# Patient Record
Sex: MALE | Race: WHITE | NOT HISPANIC OR LATINO | URBAN - METROPOLITAN AREA
[De-identification: names, ages, dates, MRNs, and addresses within clinical notes are randomized per-mention and may not be internally consistent; named-entity substitution may affect disease eponyms.]

---

## 2019-11-01 ENCOUNTER — OFFICE VISIT - HEALTHEAST (OUTPATIENT)
Dept: FAMILY MEDICINE | Facility: CLINIC | Age: 59
End: 2019-11-01

## 2019-11-01 DIAGNOSIS — Z12.11 SCREEN FOR COLON CANCER: ICD-10-CM

## 2019-11-01 DIAGNOSIS — G47.01 INSOMNIA DUE TO MEDICAL CONDITION: ICD-10-CM

## 2019-11-01 DIAGNOSIS — Z79.899 CONTROLLED SUBSTANCE AGREEMENT SIGNED: ICD-10-CM

## 2019-11-01 DIAGNOSIS — Z80.8 FAMILY HISTORY OF SKIN CANCER: ICD-10-CM

## 2019-11-01 DIAGNOSIS — Z13.220 SCREENING, LIPID: ICD-10-CM

## 2019-11-01 DIAGNOSIS — Z85.46 HISTORY OF PROSTATE CANCER: ICD-10-CM

## 2019-11-01 DIAGNOSIS — Z00.00 ROUTINE GENERAL MEDICAL EXAMINATION AT A HEALTH CARE FACILITY: ICD-10-CM

## 2019-11-01 LAB
CANNABINOIDS UR QL SCN: NORMAL
CHOLEST SERPL-MCNC: 208 MG/DL
COCAINE UR QL: NORMAL
CREAT UR-MCNC: 25.6 MG/DL
FASTING STATUS PATIENT QL REPORTED: ABNORMAL
HDLC SERPL-MCNC: 48 MG/DL
LDLC SERPL CALC-MCNC: 132 MG/DL
PSA SERPL-MCNC: 0.4 NG/ML (ref 0–4.5)
TRIGL SERPL-MCNC: 140 MG/DL

## 2019-11-01 ASSESSMENT — MIFFLIN-ST. JEOR: SCORE: 1491.28

## 2020-01-10 ENCOUNTER — RECORDS - HEALTHEAST (OUTPATIENT)
Dept: ADMINISTRATIVE | Facility: OTHER | Age: 60
End: 2020-01-10

## 2020-01-29 ENCOUNTER — RECORDS - HEALTHEAST (OUTPATIENT)
Dept: ADMINISTRATIVE | Facility: OTHER | Age: 60
End: 2020-01-29

## 2020-01-30 ENCOUNTER — RECORDS - HEALTHEAST (OUTPATIENT)
Dept: ADMINISTRATIVE | Facility: OTHER | Age: 60
End: 2020-01-30

## 2020-03-25 ENCOUNTER — NURSE TRIAGE (OUTPATIENT)
Dept: NURSING | Facility: CLINIC | Age: 60
End: 2020-03-25

## 2020-03-25 ENCOUNTER — OFFICE VISIT - HEALTHEAST (OUTPATIENT)
Dept: FAMILY MEDICINE | Facility: CLINIC | Age: 60
End: 2020-03-25

## 2020-03-25 DIAGNOSIS — H10.32 ACUTE CONJUNCTIVITIS OF LEFT EYE, UNSPECIFIED ACUTE CONJUNCTIVITIS TYPE: ICD-10-CM

## 2020-03-25 RX ORDER — KETOCONAZOLE 20 MG/ML
1 SHAMPOO TOPICAL DAILY
Status: SHIPPED | COMMUNITY
Start: 2020-01-16 | End: 2022-03-22

## 2020-03-25 NOTE — TELEPHONE ENCOUNTER
"Tate Christenson called with concerns re: possible pink eye.  Reports 2 days of reddened sclera left eye with itchiness, pain and some loss of visual acuity.    Information given for his primary care clinic ([facility]).   Maribeth Myers, RN, BSN  [facility] Nurse Triage      Additional Information    Negative: Chemical got in the eye    Negative: Piece of something got in the eye    Negative: Followed an eye injury    Negative: Yellow or green pus in the eyes    Negative: Severe eye pain    Negative: Patient sounds very sick or weak to the triager    Negative: Blurred vision    Negative: Cloudy spot or sore seen on the cornea (clear part of the eye)    Negative: Eyelids are very swollen (shut or almost)    Negative: Eyelid (outer) is very red    Negative: Vomiting    Negative: Foreign body sensation ('feels like something is in there')    Negative: Recent eye surgery and has increasing eye pain    Negative: Eye pain/discomfort that is more than mild    Only 1 eye is red, and persists > 48 hours    Answer Assessment - Initial Assessment Questions  1. LOCATION: Location: \"What's red, the eyeball or the outer eyelids?\" (Note: when callers say the eye is red, they usually mean the sclera is red)        sclera  2. REDNESS OF SCLERA: \"Is the redness in one or both eyes?\" \"When did the redness start?\"       Left eye  3. ONSET: \"When did the eye become red?\" (e.g., hours, days)       2 days  4. EYELIDS: \"Are the eyelids red or swollen?\" If so, ask: \"How much?\"       No/no  5. VISION: \"Is there any difficulty seeing clearly?\"       Blurring noted with distance  6. ITCHING: \"Does it feel itchy?\" If so ask: \"How bad is it\" (e.g., Scale 1-10; or mild, moderate, severe)      Itchy; 4-5/10  7. PAIN: \"Is there any pain? If so, ask: \"How bad is it?\" (e.g., Scale 1-10; or mild, moderate, severe)      Pain; 4-5/10  8. CONTACT LENS: \"Do you wear contacts?\"      No   9. CAUSE: \"What do you think is causing " "the redness?\"      Unknown   10. OTHER SYMPTOMS: \"Do you have any other symptoms?\" (e.g., fever, runny nose, cough, vomiting)        none    Protocols used: EYE - RED WITHOUT PUS-A-OH      "

## 2020-03-26 ENCOUNTER — COMMUNICATION - HEALTHEAST (OUTPATIENT)
Dept: SCHEDULING | Facility: CLINIC | Age: 60
End: 2020-03-26

## 2020-04-18 ENCOUNTER — COMMUNICATION - HEALTHEAST (OUTPATIENT)
Dept: SCHEDULING | Facility: CLINIC | Age: 60
End: 2020-04-18

## 2020-04-20 ENCOUNTER — OFFICE VISIT - HEALTHEAST (OUTPATIENT)
Dept: FAMILY MEDICINE | Facility: CLINIC | Age: 60
End: 2020-04-20

## 2020-04-20 DIAGNOSIS — H10.33 ACUTE CONJUNCTIVITIS OF BOTH EYES, UNSPECIFIED ACUTE CONJUNCTIVITIS TYPE: ICD-10-CM

## 2020-06-14 ENCOUNTER — COMMUNICATION - HEALTHEAST (OUTPATIENT)
Dept: FAMILY MEDICINE | Facility: CLINIC | Age: 60
End: 2020-06-14

## 2020-06-14 DIAGNOSIS — G47.01 INSOMNIA DUE TO MEDICAL CONDITION: ICD-10-CM

## 2020-06-16 ENCOUNTER — COMMUNICATION - HEALTHEAST (OUTPATIENT)
Dept: FAMILY MEDICINE | Facility: CLINIC | Age: 60
End: 2020-06-16

## 2020-07-21 ENCOUNTER — COMMUNICATION - HEALTHEAST (OUTPATIENT)
Dept: FAMILY MEDICINE | Facility: CLINIC | Age: 60
End: 2020-07-21

## 2020-07-21 DIAGNOSIS — G47.01 INSOMNIA DUE TO MEDICAL CONDITION: ICD-10-CM

## 2020-08-12 ENCOUNTER — COMMUNICATION - HEALTHEAST (OUTPATIENT)
Dept: FAMILY MEDICINE | Facility: CLINIC | Age: 60
End: 2020-08-12

## 2020-08-12 DIAGNOSIS — G47.01 INSOMNIA DUE TO MEDICAL CONDITION: ICD-10-CM

## 2020-09-16 ENCOUNTER — COMMUNICATION - HEALTHEAST (OUTPATIENT)
Dept: SCHEDULING | Facility: CLINIC | Age: 60
End: 2020-09-16

## 2020-09-16 DIAGNOSIS — G47.01 INSOMNIA DUE TO MEDICAL CONDITION: ICD-10-CM

## 2020-09-18 ENCOUNTER — COMMUNICATION - HEALTHEAST (OUTPATIENT)
Dept: FAMILY MEDICINE | Facility: CLINIC | Age: 60
End: 2020-09-18

## 2020-10-15 ENCOUNTER — COMMUNICATION - HEALTHEAST (OUTPATIENT)
Dept: ADMINISTRATIVE | Facility: CLINIC | Age: 60
End: 2020-10-15

## 2020-10-15 DIAGNOSIS — G47.01 INSOMNIA DUE TO MEDICAL CONDITION: ICD-10-CM

## 2020-12-15 ENCOUNTER — COMMUNICATION - HEALTHEAST (OUTPATIENT)
Dept: FAMILY MEDICINE | Facility: CLINIC | Age: 60
End: 2020-12-15

## 2020-12-15 DIAGNOSIS — G47.01 INSOMNIA DUE TO MEDICAL CONDITION: ICD-10-CM

## 2020-12-29 ENCOUNTER — OFFICE VISIT - HEALTHEAST (OUTPATIENT)
Dept: FAMILY MEDICINE | Facility: CLINIC | Age: 60
End: 2020-12-29

## 2020-12-29 DIAGNOSIS — Z13.220 SCREENING, LIPID: ICD-10-CM

## 2020-12-29 DIAGNOSIS — Z00.00 ROUTINE GENERAL MEDICAL EXAMINATION AT A HEALTH CARE FACILITY: ICD-10-CM

## 2020-12-29 DIAGNOSIS — Z85.46 HISTORY OF PROSTATE CANCER: ICD-10-CM

## 2020-12-29 DIAGNOSIS — Z11.59 NEED FOR HEPATITIS C SCREENING TEST: ICD-10-CM

## 2020-12-29 DIAGNOSIS — Z79.899 CONTROLLED SUBSTANCE AGREEMENT SIGNED: ICD-10-CM

## 2020-12-29 LAB — PSA SERPL-MCNC: 0.8 NG/ML (ref 0–4.5)

## 2020-12-29 RX ORDER — SODIUM PHOSPHATE,MONO-DIBASIC 19G-7G/118
1 ENEMA (ML) RECTAL 3 TIMES DAILY
Status: SHIPPED | COMMUNITY
Start: 2020-12-29

## 2020-12-29 ASSESSMENT — MIFFLIN-ST. JEOR: SCORE: 1538.63

## 2020-12-30 LAB — HCV AB SERPL QL IA: NEGATIVE

## 2021-01-01 LAB
6MAM UR QL: NOT DETECTED
7AMINOCLONAZEPAM UR QL: NOT DETECTED
A-OH ALPRAZ UR QL: PRESENT
ALPRAZ UR QL: NOT DETECTED
AMPHET UR QL SCN: NOT DETECTED
BARBITURATES UR QL: NOT DETECTED
BUPRENORPHINE UR QL: NOT DETECTED
BZE UR QL: NOT DETECTED
CARBOXYTHC UR QL: NOT DETECTED
CARISOPRODOL UR QL: NOT DETECTED
CLONAZEPAM UR QL: NOT DETECTED
CODEINE UR QL: NOT DETECTED
CREAT UR-MCNC: 85.4 MG/DL (ref 20–400)
DIAZEPAM UR QL: NOT DETECTED
ETHYL GLUCURONIDE UR QL: NOT DETECTED
FENTANYL UR QL: NOT DETECTED
HYDROCODONE UR QL: NOT DETECTED
HYDROMORPHONE UR QL: NOT DETECTED
LORAZEPAM UR QL: NOT DETECTED
MDA UR QL: NOT DETECTED
MDEA UR QL: NOT DETECTED
MDMA UR QL: NOT DETECTED
ME-PHENIDATE UR QL: NOT DETECTED
MEPERIDINE UR QL: NOT DETECTED
METHADONE UR QL: NOT DETECTED
METHAMPHET UR QL: NOT DETECTED
MIDAZOLAM UR QL SCN: NOT DETECTED
MORPHINE UR QL: NOT DETECTED
NORBUPRENORPHINE UR QL CFM: NOT DETECTED
NORDIAZEPAM UR QL: NOT DETECTED
NORFENTANYL UR QL: NOT DETECTED
NORHYDROCODONE UR QL CFM: NOT DETECTED
NOROXYCODONE UR QL CFM: NOT DETECTED
NOROXYMORPHONE UR QL SCN: NOT DETECTED
OXAZEPAM UR QL: NOT DETECTED
OXYCODONE UR QL: NOT DETECTED
OXYMORPHONE UR QL: NOT DETECTED
PATHOLOGY STUDY: NORMAL
PCP UR QL: NOT DETECTED
PHENTERMINE UR QL: NOT DETECTED
PROPOXYPH UR QL: NOT DETECTED
SERVICE CMNT-IMP: NORMAL
TAPENTADOL UR QL SCN: NOT DETECTED
TAPENTADOL UR QL SCN: NOT DETECTED
TEMAZEPAM UR QL: NOT DETECTED
TRAMADOL UR QL: NOT DETECTED
ZOLPIDEM UR QL: NOT DETECTED

## 2021-01-06 ENCOUNTER — COMMUNICATION - HEALTHEAST (OUTPATIENT)
Dept: FAMILY MEDICINE | Facility: CLINIC | Age: 61
End: 2021-01-06

## 2021-01-06 DIAGNOSIS — Z85.46 HISTORY OF PROSTATE CANCER: ICD-10-CM

## 2021-01-07 ENCOUNTER — COMMUNICATION - HEALTHEAST (OUTPATIENT)
Dept: ADMINISTRATIVE | Facility: CLINIC | Age: 61
End: 2021-01-07

## 2021-01-07 ENCOUNTER — AMBULATORY - HEALTHEAST (OUTPATIENT)
Dept: FAMILY MEDICINE | Facility: CLINIC | Age: 61
End: 2021-01-07

## 2021-01-07 DIAGNOSIS — G47.01 INSOMNIA DUE TO MEDICAL CONDITION: ICD-10-CM

## 2021-01-07 DIAGNOSIS — R97.21 INCREASING PSA LEVEL AFTER TREATMENT FOR PROSTATE CANCER: ICD-10-CM

## 2021-01-07 DIAGNOSIS — Z85.46 HISTORY OF PROSTATE CANCER: ICD-10-CM

## 2021-01-07 RX ORDER — TAMSULOSIN HYDROCHLORIDE 0.4 MG/1
CAPSULE ORAL
Qty: 90 CAPSULE | Refills: 3 | Status: SHIPPED | OUTPATIENT
Start: 2021-01-07 | End: 2021-12-30

## 2021-02-08 ENCOUNTER — COMMUNICATION - HEALTHEAST (OUTPATIENT)
Dept: FAMILY MEDICINE | Facility: CLINIC | Age: 61
End: 2021-02-08

## 2021-02-08 DIAGNOSIS — Z79.899 CONTROLLED SUBSTANCE AGREEMENT SIGNED: ICD-10-CM

## 2021-02-08 DIAGNOSIS — G47.01 INSOMNIA DUE TO MEDICAL CONDITION: ICD-10-CM

## 2021-02-08 RX ORDER — ALPRAZOLAM 0.5 MG
0.5 TABLET ORAL
Qty: 30 TABLET | Refills: 5 | Status: SHIPPED | OUTPATIENT
Start: 2021-02-08 | End: 2021-08-03

## 2021-03-11 ENCOUNTER — COMMUNICATION - HEALTHEAST (OUTPATIENT)
Dept: ADMINISTRATIVE | Facility: CLINIC | Age: 61
End: 2021-03-11

## 2021-04-02 ENCOUNTER — OFFICE VISIT - HEALTHEAST (OUTPATIENT)
Dept: FAMILY MEDICINE | Facility: CLINIC | Age: 61
End: 2021-04-02

## 2021-04-02 DIAGNOSIS — Z85.46 HISTORY OF PROSTATE CANCER: ICD-10-CM

## 2021-04-02 DIAGNOSIS — L03.031 PARONYCHIA OF TOE, RIGHT: ICD-10-CM

## 2021-04-02 DIAGNOSIS — R97.21 INCREASING PSA LEVEL AFTER TREATMENT FOR PROSTATE CANCER: ICD-10-CM

## 2021-04-02 LAB — PSA SERPL-MCNC: 0.4 NG/ML (ref 0–4.5)

## 2021-04-08 ENCOUNTER — COMMUNICATION - HEALTHEAST (OUTPATIENT)
Dept: INTERNAL MEDICINE | Facility: CLINIC | Age: 61
End: 2021-04-08

## 2021-04-15 ENCOUNTER — COMMUNICATION - HEALTHEAST (OUTPATIENT)
Dept: FAMILY MEDICINE | Facility: CLINIC | Age: 61
End: 2021-04-15

## 2021-04-15 ENCOUNTER — OFFICE VISIT - HEALTHEAST (OUTPATIENT)
Dept: FAMILY MEDICINE | Facility: CLINIC | Age: 61
End: 2021-04-15

## 2021-04-15 DIAGNOSIS — L60.0 INGROWN TOENAIL: ICD-10-CM

## 2021-04-19 ENCOUNTER — AMBULATORY - HEALTHEAST (OUTPATIENT)
Dept: NURSING | Facility: CLINIC | Age: 61
End: 2021-04-19

## 2021-05-10 ENCOUNTER — AMBULATORY - HEALTHEAST (OUTPATIENT)
Dept: NURSING | Facility: CLINIC | Age: 61
End: 2021-05-10

## 2021-06-02 NOTE — PROGRESS NOTES
MALE PREVENTATIVE EXAM    Assessment and Plan:       1. Routine general medical examination at a health care facility    2. Insomnia due to medical condition  Well controlled on:   - ALPRAZolam (XANAX) 0.5 MG tablet; Take 1 tablet (0.5 mg total) by mouth at bedtime as needed for sleep.  Dispense: 30 tablet; Refill: 5    3. Controlled substance agreement signed  For above medication  - Drug Abuse 3, Urine    4. Family history of skin cancer  - Ambulatory referral to Dermatology    5. Screen for colon cancer  - Ambulatory referral for Colonoscopy    6. History of prostate cancer  - tamsulosin (FLOMAX) 0.4 mg cap; Take 1 capsule (0.4 mg total) by mouth daily after supper.  Dispense: 90 capsule; Refill: 3  - PSA, Annual Screen (Prostatic-Specific Antigen)    7. Screening, lipid  - Lipid Cascade FASTING        Next follow up:  No follow-ups on file.    Immunization Review  Adult Imm Review: No immunizations due today    I discussed the following with the patient:        Patient Instructions   Call to schedule  [facility]  PHONE: [phone]    Send a copy of your living will by [employer]    Check for coverage of shingles vaccine      I have had an Advance Directives discussion with the patient.    Subjective:   Chief Complaint: Tate Brothers is an 59 y.o. male here for a preventative health visit.     HPI:  Tate is new to [facility] and [facility].  He and his wife moved here from [place], [place] and have been rehabbing a house here.  They are both retired elementary school teachers    Ongoing issues    Tate - had prostate cancer 8-9 years ago - PSA levels have come down.  Had been getting yearly PSA with his urologist  until 2 years ago -at that point his urologist 'released' him but urged continued yearly checks through PCP.  Tate takes Flomax    Tinnitus: Tate has had ringing of the ears for a long time - now wears hearing aids that help him ignore the  ringing and to get to sleep. Also, he takes  lipoflavinoid supplement  for ear health/ringing and has found this helpful    Insomnia - Tate says he has been taking alprazolam 0.5 mg nightly for years and this works well    Saw a dermatologist in [place] - has seborrheic dermatitis - uses a skin cream; has had some sun damage -  He is concerned about a  SPOT on left lower leg -    Preventive   - Had a colonoscopy age 48 - found benign polyps - then got one age 53 - when he turned 58 was when he was in the process of moving so he is due now for colonoscopy   - takes  glucosamine - for joints - runs 5-6 miles daily   - he HAS a living will    Social history - he grew up on [place] farm in [place] - parents immigrants from [place].  He is doing some part-time work for a [employer] company.  He and his wife like it here - daughter went to [facility] - working in [employer]. They have two children - son Fred is looking for working and living with them. They have a cat        Healthy Habits  Are you taking a daily aspirin? No  Do you typically exercising at least 40 min, 3-4 times per week?  Yes  - running daily  Do you usually eat at least 4 servings of fruit and vegetables a day, include whole grains and fiber and avoid regularly eating high fat foods? NO - maybe 2-3  Have you had an eye exam in the past two years? NO  Do you see a dentist twice per year? Yes  Do you have any concerns regarding sleep? No    Safety Screen  If you own firearms, are they secured in a locked gun cabinet or with trigger locks? The patient does not own any firearms  Do you feel you are safe where you are living?: Yes (11/1/2019 11:10 AM)  Do you feel you are safe in your relationship(s)?: Yes (11/1/2019 11:10 AM)      Review of Systems:    Constitutional: negative for recent illness or change in weight  Eyes: negative for irritation and vision change  Ears, nose, mouth, throat, and face: negative for nasal congestion and sore throat  Respiratory: negative for  "cough and dyspnea on exertion  Cardiovascular: negative for chest pain and palpitations  Gastrointestinal: negative for abdominal pain and change in bowel habits  Genitourinary:negative for dysuria, frequency and hesitancy  Integument/breast: negative for rash  Hematologic/lymphatic: negative for bleeding and easy bruising  Musculoskeletal:negative for arthralgias and myalgias  Neurological: negative for dizziness, headaches and paresthesia      Cancer Screening       Status Date      COLONOSCOPY Overdue 4/7/2008             History     Reviewed By Date/Time Sections Reviewed    Sonali Zacarias MD 11/1/2019 12:10 PM Tobacco, Alcohol, Drug Use, Sexual Activity    Sonali Zacarias MD 11/1/2019 12:09 PM Family    Sonali Zacarias MD 11/1/2019 11:55 AM Geno Braun CMA 11/1/2019 11:14 AM Tobacco, Alcohol, Drug Use, Sexual Activity            Objective:   Vital Signs:   Visit Vitals  /78 (Patient Site: Right Arm, Patient Position: Sitting, Cuff Size: Adult Regular)   Pulse 60   Ht 5' 9.75\" (1.772 m)   Wt 153 lb (69.4 kg)   BMI 22.11 kg/m           PHYSICAL EXAM  General appearance - alert, well appearing, and in no distress  Mental status - normal mood, behavior, speech, dress, motor activity, and thought processes  Eyes - pupils equal and reactive, extraocular eye movements intact, funduscopic exam normal, discs flat and sharp  Ears - bilateral TM's and external ear canals normal  Mouth - mucous membranes moist, pharynx normal without lesions  Neck - supple, no significant adenopathy, carotids upstroke normal bilaterally, no bruits, thyroid exam: thyroid is normal in size without nodules or tenderness  Chest - clear to auscultation, no wheezes, rales or rhonchi, symmetric air entry  Heart - normal rate, regular rhythm, normal S1, S2, no murmurs, rubs, clicks or gallops  Abdomen - soft, nontender, nondistended, no masses or organomegaly  Neurological - alert, oriented, normal speech, no focal findings or " movement disorder noted, DTR's normal and symmetric  Extremities - peripheral pulses normal, no pedal edema, no clubbing or cyanosis  Skin - no rashes or worrisome lesions; fair skin, some non-descript sun damage on face, seborrheic dermatitis around eyebrows, scattered seborrheic keratoses, including a dry patch on left leg (the spot he was concerned about)

## 2021-06-02 NOTE — PATIENT INSTRUCTIONS - HE
Call to schedule  [facility]  PHONE: [phone]    Send a copy of your living will by [employer]    Check for coverage of shingles vaccine

## 2021-06-03 VITALS
BODY MASS INDEX: 21.9 KG/M2 | WEIGHT: 153 LBS | HEART RATE: 60 BPM | SYSTOLIC BLOOD PRESSURE: 120 MMHG | DIASTOLIC BLOOD PRESSURE: 78 MMHG | HEIGHT: 70 IN

## 2021-06-05 VITALS
HEART RATE: 56 BPM | OXYGEN SATURATION: 98 % | BODY MASS INDEX: 23.27 KG/M2 | SYSTOLIC BLOOD PRESSURE: 110 MMHG | WEIGHT: 162.56 LBS | DIASTOLIC BLOOD PRESSURE: 80 MMHG | HEIGHT: 70 IN

## 2021-06-05 VITALS
OXYGEN SATURATION: 99 % | SYSTOLIC BLOOD PRESSURE: 114 MMHG | TEMPERATURE: 97.9 F | DIASTOLIC BLOOD PRESSURE: 70 MMHG | WEIGHT: 161 LBS | HEART RATE: 51 BPM | BODY MASS INDEX: 23.1 KG/M2

## 2021-06-07 NOTE — TELEPHONE ENCOUNTER
Diagnosed with pink eye at end of March. Started on polymyxin B sulfate trimethoprim ophthalmic solutionprescription on March 25th. Started with his left eye and then started having R eye symptoms so began using also for right eye on 27th of March. Pt states he used this 3x/day for 7 days. Stopped using on right eye 4/2. Symptoms subsided for the most part in the week after that. Tried OTC eyedrops afterwards from [email or web address] dextram 70% polyethylene glycol for dryness.    Last night patient noticed L eye is red and having small amount of clear discharge with yellow crusts in the corner of eyes. Eyelids intermittently sticking together. Denies changes in vision.Denies swelling. No pain. Denies fever. Pt has slight runny nose. Denies any other abnormalities.      Pt does not want to do [facility] visit at this time. Will manage symptoms at home for now with cleansing. Set up with central scheduling to schedule a telephone visit with provider.     Reason for Disposition    [1] Eye with yellow/green discharge or eyelashes stick together AND [2] NO PCP standing order to call in antibiotic eye drops    Protocols used: EYE - PUS OR [id number] Lane RN

## 2021-06-07 NOTE — TELEPHONE ENCOUNTER
RN triage   Call from pt   Pt states he had telephone visit yesterday for pink eye  Pt states he forgot to ask provider when he can go back to work -   Discussed current state order to stay home  reviewed home care advice - and contagiousness - and when to call back  Rivka Alonso RN [facility] RN triage

## 2021-06-07 NOTE — PROGRESS NOTES
"Tate Brothers is a 59 y.o. male who is being evaluated via a billable telephone visit.      CALL # [phone]    The patient has been notified of following:     \"This telephone visit will be conducted via a call between you and your physician/provider. We have found that certain health care needs can be provided without the need for a physical exam.  This service lets us provide the care you need with a short phone conversation.  If a prescription is necessary we can send it directly to your pharmacy.  If lab work is needed we can place an order for that and you can then stop by our lab to have the test done at a later time.    If during the course of the call the physician/provider feels a telephone visit is not appropriate, you will not be charged for this service.\"         Tate Brothers complains of    Chief Complaint   Patient presents with     Conjunctivitis     3 days very itchy       I have reviewed and updated the patient's Past Medical History, Social History, Family History and Medication List.    ALLERGIES  Patient has no known allergies.      Vega Tapia Jr., [facility]      Assessment/Plan:     Problem List Items Addressed This Visit     None        No follow-ups on file.    Subjective:   59 y.o. male presents for 3 days of eye itching and pinkness.  Did try refresh drops without help.  No changes in vision.  Slight discharge in the corners of the eyes and across the eyelids in the morning.  Only left eye is affected.  No pain in the temporal area or recent illness, fevers or chills.      Conjunctivitis    Episode onset: 3 days ago. The onset was gradual. The problem has been gradually worsening. Nothing relieves the symptoms. Nothing aggravates the symptoms. Associated symptoms include eye itching and eye discharge. Pertinent negatives include no fever, no decreased vision, no double vision, no photophobia, no abdominal pain, no nausea, no congestion, no ear discharge, no ear pain, no " headaches, no hearing loss, no mouth sores, no rhinorrhea, no sore throat, no swollen glands, no muscle aches, no cough and no eye pain. The left eye is affected.       Review of Systems   Constitutional: Negative for fever.   HENT: Negative for congestion, ear discharge, ear pain, hearing loss, mouth sores, rhinorrhea and sore throat.    Eyes: Positive for discharge and itching. Negative for double vision, photophobia and pain.   Respiratory: Negative for cough.    Gastrointestinal: Negative for abdominal pain and nausea.   Neurological: Negative for headaches.        History     Reviewed By Date/Time Sections Reviewed    Mike Casanova,  3/25/2020  4:02 PM Medical, Surgical, Tobacco, Family, Socioeconomic    Regina, Vega DUBOIS Jr., [facility] 3/25/2020  3:38 PM Tobacco           Objective:   There were no vitals filed for this visit.  Physical Exam  No physical exam: Due to current viral pandemic patient was evaluated by phone.    Total time: 8 minutes 59 seconds  This note has been dictated using voice recognition software. Any grammatical or context distortions are unintentional and inherent to the software

## 2021-06-07 NOTE — PROGRESS NOTES
"Tate Brothers is a 60 y.o. male who is being evaluated via a billable video visit.      The patient has been notified of following:     \"This video visit will be conducted via a call between you and your physician/provider. We have found that certain health care needs can be provided without the need for an in-person physical exam.  This service lets us provide the care you need with a video conversation.  If a prescription is necessary we can send it directly to your pharmacy.  If lab work is needed we can place an order for that and you can then stop by our lab to have the test done at a later time.    Video visits are billed at different rates depending on your insurance coverage. Please reach out to your insurance provider with any questions.    If during the course of the call the physician/provider feels a video visit is not appropriate, you will not be charged for this service.\"    Patient has given verbal consent to a Video visit? Yes    Patient would like to receive their AVS by AVS Preference: Karly.    Patient would like the video invitation sent by: Text to cell phone: [phone]      Phone Start Time: .4:05  Additional provider notes:     Assessment/Plan:     Problem List Items Addressed This Visit     None      Visit Diagnoses     Acute conjunctivitis of both eyes, unspecified acute conjunctivitis type    -  Primary    Most likely allergy. discussed OTC symptomatic treatment as needed.        Return in about 6 months (around 11/1/2020) for Annual physical.    Subjective:   60 y.o. male presents for follow-up of recent conjunctivitis.  Actually gotten better on the eyedrops.  Then at the end of the treatment he had 2 days in which his eye was itchy and he had some yellow matting in his eye in the more across his eyelids.  Just in the left eye with some mild irritation around the eyelids.  He did not restart the antibiotic drops but he did use a rewetting drop.  Now today he woke up in the pink " in his eye as well as the redness around his eyelid and the itching are all gone.  He has also noticed a little bit more of a runny nose.  Not full Frane congestion.  Denies any fevers or chills.  He was outside a bit more this weekend.  He was not working on anything which may have flown or gotten into his eye.  No changes in vision.  No fevers or chills.    Reviewed pollen counts over the weekend and end of this week.  Symptoms came up at a time when tree pollens were at their height.  Symptoms seem to resolve as tree pollens decreased.        Review of Systems   Constitutional: Negative for chills, fatigue and fever.   HENT: Positive for congestion, postnasal drip and sneezing. Negative for ear discharge, ear pain, facial swelling, hearing loss, sinus pressure, sinus pain, tinnitus, trouble swallowing and voice change.    Eyes: Positive for discharge, redness and itching. Negative for photophobia, pain and visual disturbance.   Respiratory: Negative for cough, choking, shortness of breath and wheezing.    Cardiovascular: Negative for chest pain, palpitations and leg swelling.   Gastrointestinal: Negative for abdominal pain.   Genitourinary: Negative for dysuria.   Musculoskeletal: Negative for neck pain.        History     Reviewed By Date/Time Sections Reviewed    Mike Casanova,  4/20/2020  3:43 PM Medical, Surgical, Tobacco, Family, Socioeconomic    ReginaVega gaston Jr., [facility] 4/20/2020  3:20 PM Tobacco           Objective:   There were no vitals filed for this visit.  Physical Exam  No physical exam.  Was planned to be a video conference however her technology failure on patient sent and thus moved over to telephone this note has been dictated using voice recognition software. Any grammatical or context distortions are unintentional and inherent to the software    Video-Visit Details    Type of service:  Video Visit converted the phone due to technology failure  Phone end Time: 4:13 PM      Originating Location (pt. Location): Home    Distant Location (provider location):  [facility]/OB     Mode of Communication:  Video Conference via [employer]: Technology failure and had to change over to a telephone call      Mike Brady Restaleonard, DO

## 2021-06-08 NOTE — TELEPHONE ENCOUNTER
He had a full physical with Dr. Zacarias in November and his address is over near the [facility]. I have only seen patient for acute problems by video and never addressed the insomnia issue. He has a controlled substance agreement with Dr. Zacarias and as such will need to forward to her for consideration of refill.     Respectfully,  Carlos Casanova DO

## 2021-06-08 NOTE — TELEPHONE ENCOUNTER
CSA with me 11/1/19 (also had drug screen on the day)    Diagnosis: insomnia  Medication : Alprazolam 0.5 mg nightly   Quantity per month: insomnia  Number of refills before follow up visit: 12    HOWEVER [place] Prescription Monitoring Program show refill from a provider in [facility] - he has not picked up extra refills, but nonetheless sought from a different provider - which is a violation of CSA - I will send him a [employer] message - will not refill this without more information

## 2021-06-08 NOTE — TELEPHONE ENCOUNTER
Controlled Substance Refill Request  Medication Name:   Requested Prescriptions     Pending Prescriptions Disp Refills     ALPRAZolam (XANAX) 0.5 MG tablet [Pharmacy Med Name: ALPRAZOLAM 0.5MG TABLETS] 30 tablet 0     Sig: TAKE 1 TABLET BY MOUTH AT BEDTIME AS NEEDED FOR SLEEP     Date Last Fill: 11/1/19  [facility]ed [facility]  Submit electronically to pharmacy  Controlled Substance Agreement on file:   Encounter-Level CSA Scan Date:    There are no encounter-level csa scan date.        Last office visit:  4/20/20

## 2021-06-09 NOTE — TELEPHONE ENCOUNTER
Who is calling:  patient  Reason for Call:  Calling to check on below request. Reports he has one day left of his medication. Please advise asap.  Date of last appointment with primary care: 4/20/20, virtual visit  Okay to leave a detailed message: Yes

## 2021-06-09 NOTE — TELEPHONE ENCOUNTER
Reviewed Karly note from patient and talked to RN at [facility] - Tate used to be seen there, hadn't been seen since last year and PCP there retired - requested refill when he was in town but  not to establish care. At this point it seems like a misunderstanding on his part.  He did not  ore medication that I had originally prescribed for him, so will let this go this time

## 2021-06-09 NOTE — TELEPHONE ENCOUNTER
[place] Prescription Monitoring Program reviewed - already discussed rx from other provider previously.  CSA up to date - will refill    Please let him know we just received this request today (7/22) - we are given 3 days to respond, and I was not in the office today

## 2021-06-09 NOTE — TELEPHONE ENCOUNTER
Controlled Substance Refill Request  Medication Name:   Requested Prescriptions     Pending Prescriptions Disp Refills     ALPRAZolam (XANAX) 0.5 MG tablet 30 tablet 0     Sig: Take 1 tablet (0.5 mg total) by mouth at bedtime as needed for sleep.     Date Last Fill: 6/18/20  [facility]ed [facility]  Submit electronically to pharmacy  Controlled Substance Agreement on file:   Encounter-Level CSA Scan Date:    There are no encounter-level csa scan date.        Last office visit:  4/20/20

## 2021-06-10 NOTE — TELEPHONE ENCOUNTER
Controlled Substance Refill Request  Medication Name:   Requested Prescriptions     Pending Prescriptions Disp Refills     ALPRAZolam (XANAX) 0.5 MG tablet 30 tablet 5     Sig: Take 1 tablet (0.5 mg total) by mouth at bedtime as needed for sleep.     Date Last Fill: 7/22/2020  [facility]ed [facility]  Submit electronically to pharmacy  Controlled Substance Agreement on file:   Encounter-Level CSA Scan Date:    There are no encounter-level csa scan date.        Last office visit:  4/20/2020

## 2021-06-10 NOTE — TELEPHONE ENCOUNTER
Contacted pt, they advised that they had a really hard time getting prescription filled with [facility] and may move to [facility] for future medication needs. They also advised they figured out Mychart problem as well, and are now using the [employer] version and will use this method or call straight to clinic for future refills.    YOKASTA WARE

## 2021-06-11 NOTE — TELEPHONE ENCOUNTER
Controlled Substance Refill Request  Medication Name:   Requested Prescriptions     Pending Prescriptions Disp Refills     ALPRAZolam (XANAX) 0.5 MG tablet 30 tablet 5     Sig: Take 1 tablet (0.5 mg total) by mouth at bedtime as needed for sleep.     Date Last Fill: 8/18/20  [facility]ed [facility]: [facility]  # [id number]  Submit electronically to pharmacy  Controlled Substance Agreement on file:   Encounter-Level CSA Scan Date:    There are no encounter-level csa scan date.        Last office visit:  9/20/20

## 2021-06-12 NOTE — TELEPHONE ENCOUNTER
He HAS refills and should not need another one now    [place] Prescription Monitoring Program also shows that he has refills left    WHO requested this refill? Not clear  Please call patient to remind him he has refills, and is due for an annual exam in November

## 2021-06-12 NOTE — TELEPHONE ENCOUNTER
Controlled Substance Refill Request  Medication Name:   Requested Prescriptions     Pending Prescriptions Disp Refills     ALPRAZolam (XANAX) 0.5 MG tablet 30 tablet 2     Sig: Take 1 tablet (0.5 mg total) by mouth at bedtime as needed for sleep.     Date Last Fill: 9/18/2020  [facility]ed [id number]  Submit electronically to pharmacy  Controlled Substance Agreement on file:   Encounter-Level CSA Scan Date:    There are no encounter-level csa scan date.        Last office visit:  4/20/2020

## 2021-06-13 NOTE — TELEPHONE ENCOUNTER
Controlled Substance Refill Request  Medication Name:   Requested Prescriptions     Pending Prescriptions Disp Refills     ALPRAZolam (XANAX) 0.5 MG tablet 30 tablet 2     Sig: Take 1 tablet (0.5 mg total) by mouth at bedtime as needed for sleep.     Date Last Fill: 9/18/2020  [facility]ed [facility]  Submit electronically to pharmacy  Controlled Substance Agreement on file:   Encounter-Level CSA Scan Date:    There are no encounter-level csa scan date.        Last office visit:  4/20/2020

## 2021-06-13 NOTE — TELEPHONE ENCOUNTER
"One month given - please schedule him for annual exam    My note for him with last refill 9/18/20:    Chandler Ybarra,     I gave you three month's refill on the medication you requested - You'll be due for an preventive exam in November.     The [facility] is still \"hibernated\" and we are seeing patients at [facility]  for the moment, but do not know what the situation will be in November.     Sonali Zacarias MD    "

## 2021-06-14 NOTE — TELEPHONE ENCOUNTER
Reason for Call:  Medication or medication refill:    Do you use a [facility]?  Name of the pharmacy and phone number for the current request: [facility] on [address]    Name of the medication requested:   ALPRAZolam (XANAX) 0.5 MG tablet 30 tablet 0 12/15/2020  No   Sig - Route: Take 1 tablet (0.5 mg total) by mouth at bedtime as needed for sleep. - Oral         Other request: na    Can we leave a detailed message on this number? Yes    Phone number patient can be reached at: Home number on file [phone] (home)    Best Time: anyphone call    Call taken on 1/7/2021 at 2:04 PM by Pamela Behr

## 2021-06-14 NOTE — PROGRESS NOTES
From last physical     Tate - had prostate cancer 8-9 years ago - PSA levels have come down.  Had been getting yearly PSA with his urologist  until 2 years ago -at that point his urologist 'released' him but urged continued yearly checks through PCP.  Tate takes Flomax     Tinnitus: Tate has had ringing of the ears for a long time - now wears hearing aids that help him ignore the ringing and to get to sleep. Also, he takes  lipoflavinoid supplement  for ear health/ringing and has found this helpful     Insomnia - Tate says he has been taking alprazolam 0.5 mg nightly for years and this works well     Saw a dermatologist in [place] - has seborrheic dermatitis - uses a skin cream; has had some sun damage -  He is concerned about a  SPOT on left lower leg -     Preventive   - Had a colonoscopy age 48 - found benign polyps - then got one age 53 - when he turned 58 was when he was in the process of moving so he is due now for colonoscopy   - takes  glucosamine - for joints - runs 5-6 miles daily   - he HAS a living will     Social history - he grew up [place] farm in [place] - parents immigrants from [place].  He is doing some part-time work for a [employer] company.  He and his wife like it here - daughter went to [employer] - working in [employer]. They have two children - son Fred is looking for working and living with them. They have a cat

## 2021-06-14 NOTE — TELEPHONE ENCOUNTER
Refill Approved    Rx renewed per Medication Renewal Policy. Medication was last renewed on 11/1/19.    Irma Almaguer, [facility] Triage/Med Refill 1/7/2021     Requested Prescriptions   Pending Prescriptions Disp Refills     tamsulosin (FLOMAX) 0.4 mg cap [Pharmacy Med Name: TAMSULOSIN 0.4MG CAPSULES] 90 capsule 3     Sig: TAKE 1 CAPSULE BY MOUTH DAILY AFTER SUPPER       Alfuzosin/Tamsulosin/Silodosin Refill Protocol  Passed - 1/6/2021  3:17 AM        Passed - PCP or prescribing provider visit in past 12 months       Last office visit with prescriber/PCP: Visit date not found OR same dept: Visit date not found OR same specialty: Visit date not found  Last physical: 12/29/2020 Last MTM visit: Visit date not found   Next visit within 3 mo: Visit date not found  Next physical within 3 mo: Visit date not found  Prescriber OR PCP: Sonali Zacarias MD  Last diagnosis associated with med order: 1. History of prostate cancer  - tamsulosin (FLOMAX) 0.4 mg cap [Pharmacy Med Name: TAMSULOSIN 0.4MG CAPSULES]; TAKE 1 CAPSULE BY MOUTH DAILY AFTER SUPPER  Dispense: 90 capsule; Refill: 3    If protocol passes may refill for 12 months if within 3 months of last provider visit (or a total of 15 months).

## 2021-06-14 NOTE — PATIENT INSTRUCTIONS - HE
"Check coverage of  1) shingles vaccine  2) \"lipofit by NMR\" - let me know if this is covered and I will order this instead of the regular panel  "

## 2021-06-14 NOTE — PROGRESS NOTES
"MALE PREVENTATIVE EXAM    Assessment and Plan:     Patient has been advised of split billing requirements and indicates understanding: Yes    1. Routine general medical examination at a health care facility  Well man    2. Screening, lipid  Not fasting today  - Lipid Towns FASTING; Future    3. Need for hepatitis C screening test  Per CDC recommendation for all  - Hepatitis C Antibody (Anti-HCV)    4. History of prostate cancer  - PSA, Annual Screen (Prostatic-Specific Antigen)   - if elevated would refer to urology    5. Controlled substance agreement signed  For alprazolam for insomnia  - Pain Management Drug Panel, Urine   -  controlled substance agreement done    Next follow up:  Return in about 1 year (around 2021) for or earlier as needed.    Immunization Review  Adult Imm Review: Due today, orders placed  influenza  Patient Instructions   Check coverage of  1) shingles vaccine  2) \"lipofit by NMR\" - let me know if this is covered and I will order this instead of the regular panel      Sonali Zacarias MD      Subjective:   Chief Complaint: Tate Brothers is an 60 y.o. male here for a preventative health visit.    Patient has been advised of split billing requirements and indicates understanding: Yes  HPI:     ONGOING ISSUES    Tinnitus - wears hearing aids - has been to a hearing doctor in [place] - got his hearing checked  - last check was 4-5 years ago.  A clinic in [place] tuned them up and they seem to be working - though she thought I I would need new ones soon.    Hyperlipidemia  Lab Results   Component Value Date    LDLCALC 132 (H) 2019     Discussed with Tate last year (via [facility]) recommendation to start statin, given ASCDV risk score. I  ask about family Dad  of a heart attack at age 67 - non smoking, and that his elevated LDL is likely genetic - he eats well and exercises    The 10-year ASCVD risk score (Joseph ARLIN Jr., et al., 2013) is: 8.3%    Values used to calculate the " "score:      Age: 60 years      Sex: Male      Is Non- : No      Diabetic: No      Tobacco smoker: No      Systolic Blood Pressure: 110 mmHg      Is BP treated: No      HDL Cholesterol: 48 mg/dL      Total Cholesterol: 208 mg/dL    Insomnia - takes alprazolam 0.5 mg many but not all nights.      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  Wife also works at same company.  Their  25 year old son Fred is living with them- he has high functioning Autism Spectrum. They have gone to several organizations for support - he has a job now at the same company - working 25 hours per week.  He does not want to talk about the future.  He has a 's licence which is progress    They a half chihuahua/ half daschund \"Milo\" for a rescue - it's been good for Fred to have [place]    Healthy Habits  Are you taking a daily aspirin? No  Do you typically exercising at least 40 min, 3-4 times per week?  Yes  - runs and walks - in the summer does a lot of biking  Do you usually eat at least 4 servings of fruit and vegetables a day, include whole grains and fiber and avoid regularly eating high fat foods? Yes, doesn't eat that much fruit in a day  Have you had an eye exam in the past two years? Yes  Do you see a dentist twice per year? Yes  Do you have any concerns regarding sleep? YES; takes alprazolam before bedtime and sleeps through the night; pt states that if he doesn't take the alprazolam and tamsulosin he can't get back to sleep if he wakes up in the night    Safety Screen  If you own firearms, are they secured in a locked gun cabinet or with trigger locks? The patient does not own any firearms  Do you feel you are safe where you are living?: Yes (12/29/2020  1:32 PM)  Do you feel you are safe in your relationship(s)?: Yes (12/29/2020  1:32 PM)      Review of " "Systems:    Constitutional: negative for recent illness or change in weight  Eyes: negative for irritation and vision change  Ears, nose, mouth, throat, and face: negative for nasal congestion and sore throat  Respiratory: negative for cough and dyspnea on exertion  Cardiovascular: negative for chest pain and palpitations  Gastrointestinal: negative for abdominal pain and change in bowel habits  Genitourinary:negative for dysuria, frequency and hesitancy  Integument/breast: negative for rash  Hematologic/lymphatic: negative for bleeding and easy bruising  Musculoskeletal:negative for arthralgias and myalgias  Neurological: negative for dizziness, headaches and paresthesia      Cancer Screening     Patient has no health maintenance due at this time            History     Reviewed By Date/Time Sections Reviewed    Rosanna Caba CMA 12/29/2020  1:32 PM Tobacco            Objective:   Vital Signs:   Visit Vitals  /80 (Patient Site: Left Arm, Patient Position: Sitting, Cuff Size: Adult Regular)   Pulse (!) 56   Ht 5' 10\" (1.778 m)   Wt 162 lb 9 oz (73.7 kg)   SpO2 98%   BMI 23.33 kg/m           PHYSICAL EXAM  General appearance - alert, well appearing, and in no distress  Mental status - normal mood, behavior, speech, dress, motor activity, and thought processes  Eyes - pupils equal and reactive, extraocular eye movements intact, funduscopic exam normal, discs flat and sharp  Ears - bilateral TM's and external ear canals normal  Mouth - mucous membranes moist, pharynx normal without lesions  Neck - supple, no significant adenopathy, carotids upstroke normal bilaterally, no bruits, thyroid exam: thyroid is normal in size without nodules or tenderness  Chest - clear to auscultation, no wheezes, rales or rhonchi, symmetric air entry  Heart - normal rate, regular rhythm, normal S1, S2, no murmurs, rubs, clicks or gallops  Abdomen - soft, nontender, nondistended, no masses or organomegaly  Neurological - alert, " oriented, normal speech, no focal findings or movement disorder noted, DTR's normal and symmetric  Extremities - peripheral pulses normal, no pedal edema, no clubbing or cyanosis  Skin - no rashes or worrisome lesions

## 2021-06-15 NOTE — TELEPHONE ENCOUNTER
Here is the last time this was filled:     Disp Refills Start End LOUISE   ALPRAZolam (XANAX) 0.5 MG tablet 30 tablet 0 1/8/2021  No   Sig - Route: Take 1 tablet (0.5 mg total) by mouth at bedtime as needed for sleep. - Oral   Sent to pharmacy as: ALPRAZolam 0.5 mg tablet (XANAX)   E-Prescribing Status: Receipt confirmed by pharmacy (1/8/2021  1:32 PM CST)     Uto and Here is CSA done 12/29/20      Non-opioid Controlled Substance Agreement  Diagnosis: insomnia  Medication : Alprazolam 0.5 mg nightly   Quantity per month: insomnia  Number of refills before follow up visit: 12

## 2021-06-15 NOTE — TELEPHONE ENCOUNTER
Reason for Call:  Medication or medication refill:    Do you use a [facility]?  Name of the [facility] and phone [facility] for the current [facility] [address]      Name of the medication requested:   ALPRAZolam (XANAX) 0.5 MG tablet 30 tablet 5 2/8/2021  No   Sig - Route: Take 1 tablet (0.5 mg total) by mouth at bedtime as needed for sleep. - Oral         Other request: NA    Can we leave a detailed message on this number? Yes    Phone number patient can be reached at: Home number on file [phone] (home)    Best Time: ANY    Call taken on 3/11/2021 at 10:15 AM by Pamela Behr

## 2021-06-15 NOTE — TELEPHONE ENCOUNTER
Reason for Call:  Medication or medication refill:    Do you use a [facility]?  Name of the pharmacy and phone number for the current request[facility] [address]    Name of the medication requested: alprazolam .5 mg     Other request:     Can we leave a detailed message on this number? Yes    Phone number patient can be reached at: Home number on file [phone] (home)    Best Time:     Call taken on 2/8/2021 at 2:36 PM by Yudith Naranjo

## 2021-06-16 PROBLEM — Z85.46 HISTORY OF PROSTATE CANCER: Status: ACTIVE | Noted: 2019-11-01

## 2021-06-16 PROBLEM — Z79.899 CONTROLLED SUBSTANCE AGREEMENT SIGNED: Status: ACTIVE | Noted: 2019-11-01

## 2021-06-16 PROBLEM — G47.01 INSOMNIA DUE TO MEDICAL CONDITION: Status: ACTIVE | Noted: 2019-11-01

## 2021-06-16 NOTE — PATIENT INSTRUCTIONS - HE
[facility]  PHONE: [phone]  PROVIDERS: Shin Tello DPM; Jon Francis DPM

## 2021-06-16 NOTE — PROGRESS NOTES
Tate Brothers is a 61 y.o. male who is being evaluated via a billable telephone visit.      What phone number would you like to be contacted at? [phone]  How would you like to obtain your AVS? AVS Preference: Karly.    Assessment & Plan     Ingrown toenail  Possibly - he will send a picture.  He is doing all the right things, cutting toenail straight across.  Discussed try a little antibiotic ointment where he is getting inflammation.  In case he needs it, I am providing a   - Ambulatory referral to Podiatry    Return if symptoms worsen or fail to improve.    Sonali Zacarias MD  [facility]    Subjective   Tate Brothers is 61 y.o. and presents today for the following health issues   HPI     Tate follows up from our last visit of 4/2/21 where we discussed management of paronychia.  He says of the same toe: it seemed like it was getting better  Then two days later it got red, inflamed and swollen and had some discharge - now a little better again. He has no pain, running has not affected it.  He tried using NewSkin on it and has been continuing to do the soaks we discussed  Pus hasn't been visible on the side of his toenail for a couple of days.  He is thinking he has an ingrown toenail, though he is careful to cut his toenail straight across    Has been wearing steel toe boots at work      Has first Covid19 Monday the 19th [facility]        Review of Systems  He is feeling otherwise well      Objective       Vitals:  No vitals were obtained today due to virtual visit.    Physical Exam  He sounds well and in good spirits          Phone call duration: 10 minutes  5:30 -  - 5:40

## 2021-06-16 NOTE — PROGRESS NOTES
Assessment and Plan    1. Paronychia of toe, right  Very mild, recommended two times a day soak  Re flags would be spreading redness - discussed    2. History of prostate cancer/3. Increasing PSA level after treatment for prostate cancer  recheck  - PSA, Annual Screen (Prostatic-Specific Antigen)    Return in about 9 months (around 1/2/2022) for Annual physical.    Sonali Zacarias MD     -------------------------------------------    Chief Complaint   Patient presents with     Nail Problem     left big toe is red and has a sore      Tate has had some sharp pain in his right great toe starting about 2 days ago, which has now lessened.  However he still has mild pain and has noticed a little redness around the toenail - wonders if it is infected.    He does not recall any injury, though it admits that if he had stubbed his toes during and activity, he might briefly notice it and then move on    Also, I had asked Tate to return for a repeat PSA check - his last PSA was 0.8 - normal, but double the previous check.  He has a history of prostate cancer and had radiation therapy    Current Outpatient Medications on File Prior to Visit   Medication Sig Dispense Refill     ALPRAZolam (XANAX) 0.5 MG tablet Take 1 tablet (0.5 mg total) by mouth at bedtime as needed for sleep. 30 tablet 5     bioflav,lemon/vit Bcomp,C (LIPOFLAVOVIT ORAL) Take by mouth.       glucosamine-chondroitin 500-400 mg cap Take 1 capsule by mouth 3 (three) times a day.       ketoconazole (NIZORAL) 2 % shampoo Apply 1 application topically daily.       tamsulosin (FLOMAX) 0.4 mg cap TAKE 1 CAPSULE BY MOUTH DAILY AFTER SUPPER 90 capsule 3     No current facility-administered medications on file prior to visit.            The history section was last reviewed by Alejandra Fink CMA on Apr 2, 2021.    Social History     Tobacco Use   Smoking Status Never Smoker   Smokeless Tobacco Never Used       Social History     Substance and Sexual Activity   Alcohol  Use Yes     Alcohol/week: 7.0 - 14.0 standard drinks     Types: 7 - 14 Cans of beer per week     Frequency: 4 or more times a week     Drinks per session: 1 or 2         Vitals:    04/02/21 1459   BP: 114/70   Pulse: (!) 51   Temp: 97.9  F (36.6  C)   SpO2: 99%     Body mass index is 23.1 kg/m .     EXAM:    General appearance - alert, well appearing, and in no distress  Mental status - normal mood, behavior, speech, dress, motor activity, and thought processes  Extremities - Faint erythema around right great toenail.  No pain with ROM pip or MTP joint.  No swelling or warmth

## 2021-06-19 NOTE — LETTER
Letter by Sonali Zacarias MD at      Author: Sonali Zacarias MD Service: -- Author Type: --    Filed:  Encounter Date: 11/1/2019 Status: Signed         [facility] FAMILY MEDICINE/OB  11/01/19    Patient: Tate Brothers  YOB: 1960  Medical Record Number: [id number]  CSN: [id number]                                                                              Non-opioid Controlled Substance Agreement  Diagnosis: insomnia  Medication : Alprazolam 0.5 mg nightly   Quantity per month: insomnia  Number of refills before follow up visit: 12    I understand that my care provider has prescribed a controlled substance to help manage my condition(s). I am taking this medicine to help me function or work. I know this is strong medicine, and that it can cause serious side effects. Controlled substances can be sedating, addicting and may cause a dependency on the drug. They can affect my ability to drive or think, and cause depression. They need to be taken exactly as prescribed. Combining controlled substances with certain medicines or chemicals (such as cocaine, sedatives and tranquilizers, sleeping pills, meth) can be dangerous or even fatal. Also, if I stop controlled substances suddenly, I may have severe withdrawal symptoms.  If not helpful, I may be asked to stop them.    The risks, benefits, and side effects of these medicine(s) were explained to me. I agree that:    1. I will take part in other treatments as advised by my care team. This may be psychiatry or counseling, physical therapy, behavioral therapy, group treatment or a referral to a pain clinic. I will reduce or stop my medicine when my care team tells me to do so.  2. I will take my medicines as prescribed. I will not change the dose or schedule unless my care team tells me to. There will be no refills if I run out early.  I may be contactedwithout warning and asked to complete a urine drug test or pill count at any time.   3. I will keep  all my appointments, and understand this is part of the monitoring of controlled substances. My care team may require an office visit for EVERY controlled substance refill. If I miss appointments or dont follow instructions, my care team may stop my medicine.  4. I will not ask other providers to prescribe controlled substances, and I will not accept controlled substances from other people. If I need another prescribed controlled substance for a new reason, I will tell my care team within 1 business day.  I will use one pharmacy to fill all of my controlled substance prescriptions, and it is up to me to make sure that I do not run out of my medicines on weekends or holidays. If my care team is willing to refill my controlled substance prescription without a visit, I must request refills only during office hours, refills may take up to 3 days to process, and it may [facility] FAMILY MEDICINE/OB  11/01/19  Patient:  Tate Brothers  YOB: 1960  Medical Record Number: [id number]  CSN: [id number]    5. take up to 5 to 7 days for my medicine to be mailed and ready at my pharmacy. Prescriptions will not be mailed anywhere except my pharmacy.    6. I am responsible for my prescriptions. If the medicine/prescription is lost or stolen, it will not be replaced. I also agree not to share controlled substance medicines with anyone.  7. I agree to not use ANY illegal or recreational drugs. This includes marijuana, cocaine, bath salts or other drugs. I agree not to use alcohol unless my care team says I may. I agree to give urine samples whenever asked. If I dont give a urine sample, the care team may stop my medicine.    8. If I enroll in the [place] Medical Marijuana program, I will tell my care team. I will also sign an agreement to share my medical records with my care team.    9. I will bring in my list of medicines (or my medicine bottles) each time I come to the clinic.   10. I will tell my care team  right away if I become pregnant or have a new medical problem treated outside of my regular clinic.  11. I understand that this medicine can affect my thinking and judgment. It may be unsafe for me to drive, use machinery and do dangerous tasks. I will not do any of these things until I know how the medicine affects me. If my dose changes, I will wait to see how it affects me. I will contact my care team if I have concerns about medicine side effects.    I understand that if I do not follow any of the conditions above, my prescriptions or treatment may be stopped.      I agree that my provider, clinic care team, and pharmacy may work with any city, state or federal law enforcement agency that investigates the misuse, sale, or other diversion of my controlled medicine. I will allow my provider to discuss my care with or share a copy of this agreement with any other treating provider, pharmacy or emergency room where I receive care. I agree to give up (waive) any right of privacy or confidentiality with respect to these consents.   I have read this agreement and have asked questions about anything I did not understand.    ___________________________________________________________________________  Patient signature - Date/Time  -Tate Brothers                                      ___________________________________________________________________________  Witness signature                                                                    ___________________________________________________________________________  Provider signature- Sonali Zacarias MD

## 2021-06-20 ENCOUNTER — HEALTH MAINTENANCE LETTER (OUTPATIENT)
Age: 61
End: 2021-06-20

## 2021-06-21 NOTE — LETTER
Letter by Sonali Zacarias MD at      Author: Sonali Zacarias MD Service: -- Author Type: --    Filed:  Encounter Date: 12/29/2020 Status: (Other)         [facility]  12/29/20    Patient: Tate Brothers  YOB: 1960  Medical Record Number: [id number]  CSN: [id number]                                                                              Non-opioid Controlled Substance Agreement  Diagnosis: insomnia  Medication : Alprazolam 0.5 mg nightly   Quantity per month: insomnia  Number of refills before follow up visit: 12    I understand that my care provider has prescribed a controlled substance to help manage my condition(s). I am taking this medicine to help me function or work. I know this is strong medicine, and that it can cause serious side effects. Controlled substances can be sedating, addicting and may cause a dependency on the drug. They can affect my ability to drive or think, and cause depression. They need to be taken exactly as prescribed. Combining controlled substances with certain medicines or chemicals (such as cocaine, sedatives and tranquilizers, sleeping pills, meth) can be dangerous or even fatal. Also, if I stop controlled substances suddenly, I may have severe withdrawal symptoms.  If not helpful, I may be asked to stop them.    The risks, benefits, and side effects of these medicine(s) were explained to me. I agree that:    1. I will take part in other treatments as advised by my care team. This may be psychiatry or counseling, physical therapy, behavioral therapy, group treatment or a referral to a pain clinic. I will reduce or stop my medicine when my care team tells me to do so.  2. I will take my medicines as prescribed. I will not change the dose or schedule unless my care team tells me to. There will be no refills if I run out early.  I may be contactedwithout warning and asked to complete a urine drug test or pill count at any time.   3. I will  keep all my appointments, and understand this is part of the monitoring of controlled substances. My care team may require an office visit for EVERY controlled substance refill. If I miss appointments or dont follow instructions, my care team may stop my medicine.  4. I will not ask other providers to prescribe controlled substances, and I will not accept controlled substances from other people. If I need another prescribed controlled substance for a new reason, I will tell my care team within 1 business day.  I will use one pharmacy to fill all of my controlled substance prescriptions, and it is up to me to make sure that I do not run out of my medicines on weekends or holidays. If my care team is willing to refill my controlled substance prescription without a visit, I must request refills only during office hours, refills may take up to 3 days to process, and it may [facility]  12/29/20  Patient:  Tate Brothers  YOB: 1960  Medical Record Number: [id number]  CSN: [id number]  5. take up to 5 to 7 days for my medicine to be mailed and ready at my pharmacy. Prescriptions will not be mailed anywhere except my pharmacy.    6. I am responsible for my prescriptions. If the medicine/prescription is lost or stolen, it will not be replaced. I also agree not to share controlled substance medicines with anyone.  7. I agree to not use ANY illegal or recreational drugs. This includes marijuana, cocaine, bath salts or other drugs. I agree not to use alcohol unless my care team says I may. I agree to give urine samples whenever asked. If I dont give a urine sample, the care team may stop my medicine.    8. If I enroll in the [place] Medical Marijuana program, I will tell my care team. I will also sign an agreement to share my medical records with my care team.    9. I will bring in my list of medicines (or my medicine bottles) each time I come to the clinic.   10. I will tell my care  team right away if I become pregnant or have a new medical problem treated outside of my regular clinic.  11. I understand that this medicine can affect my thinking and judgment. It may be unsafe for me to drive, use machinery and do dangerous tasks. I will not do any of these things until I know how the medicine affects me. If my dose changes, I will wait to see how it affects me. I will contact my care team if I have concerns about medicine side effects.    I understand that if I do not follow any of the conditions above, my prescriptions or treatment may be stopped.      I agree that my provider, clinic care team, and pharmacy may work with any city, state or federal law enforcement agency that investigates the misuse, sale, or other diversion of my controlled medicine. I will allow my provider to discuss my care with or share a copy of this agreement with any other treating provider, pharmacy or emergency room where I receive care. I agree to give up (waive) any right of privacy or confidentiality with respect to these consents.   I have read this agreement and have asked questions about anything I did not understand.    ___________________________________________________________________________  Patient signature - Date/Time  -Tate Brothers                                      ___________________________________________________________________________  Witness signature                                                                    ___________________________________________________________________________  Provider signature- Sonali Zacarias MD

## 2021-08-03 DIAGNOSIS — G47.01 INSOMNIA DUE TO MEDICAL CONDITION: ICD-10-CM

## 2021-08-03 RX ORDER — ALPRAZOLAM 0.5 MG
0.5 TABLET ORAL
Qty: 30 TABLET | Refills: 5 | Status: SHIPPED | OUTPATIENT
Start: 2021-08-03 | End: 2022-02-21

## 2021-08-03 NOTE — TELEPHONE ENCOUNTER
Reason for Call:  Medication or medication refill:    Do you use a [facility]?  Name of the pharmacy and phone number for the current request:  [facility] on stock and [email or web address]    Name of the medication requested:   ALPRAZolam (XANAX) 0.5 MG tablet 30 tablet 5 2/8/2021  No   Sig - Route: [ALPRAZOLAM (XANAX) 0.5 MG TABLET] Take 1 tablet (0.5 mg total) by mouth at bedtime as needed for sleep. - Oral         Other request: na    Can we leave a detailed message on this number? YES    Phone number patient can be reached at: Cell number on file:    Telephone Information:   Mobile [phone]       Best Time: na    Call taken on 8/3/2021 at 1:16 PM by Pam J. Behr

## 2021-08-04 NOTE — CONFIDENTIAL NOTE
CSA signed in 12/2020:    Diagnosis: insomnia  Medication : Alprazolam 0.5 mg nightly   Quantity per month: insomnia  Number of refills before follow up visit: 12    Total Private Pay: 0    Fill Date ID   Written Drug Qty Days Prescriber Rx # Pharmacy Refill   Daily Dose* Pymt Type      07/08/2021  3   02/08/2021  Alprazolam 0.5 MG Tablet  30.00  30 Ma [facility]   [id number]   [id number]   5  1.[place]   06/10/2021  1   02/08/2021  Alprazolam 0.5 MG Tablet  30.00  30 Ma Win   [id number]   [facility] ([id number]   [place]   05/11/2021  1   02/08/2021  Alprazolam 0.5 MG Tablet  30.00  30 Ma Win   [id number]   [facility] ([id number]   [place]   04/11/2021  2   02/08/2021  Alprazolam 0.5 MG Tablet  30.00  30 Ma Win   [id number]   [facility] ([id number]   [place]   03/11/2021  1   02/08/2021  Alprazolam 0.5 MG Tablet  30.00  30 Ma Win   [id number]   [facility] ([id number]   1  1.[place]   02/09/2021  1   02/08/2021  Alprazolam 0.5 MG Tablet  30.00  30 Ma Win   [id number]   [facility] ([id number])   0  1.[place]   01/13/2021  1   01/08/2021  Alprazolam 0.5 MG Tablet  30.00  30 Ma Win   [id number]   [facility] ([id number])   0  1.[place]   12/15/2020  1   12/15/2020  Alprazolam 0.5 MG Tablet  30.00  30 Ma Win   [id number]   [facility] ([id number]   [place]   11/15/2020  1   09/18/2020  Alprazolam 0.5 MG Tablet  30.00  30 Ma Win   [id number]   [facility] ([id number]   [place]   10/20/2020  1   09/18/2020  Alprazolam 0.5 MG Tablet  30.00  30 Ma Win   [id number]   [facility] ([id number]   1  [place]   09/18/2020  1   09/18/2020  Alprazolam 0.5 MG Tablet  30.00  30 Ma Win   [id number]   [facility] (0187[facility]   [place]  1.[place]   08/17/2020  1   07/22/2020  Alprazolam 0.5 MG Tablet  30.00  30 Ma Win   [id number]   [facility] (1757[facility]   1  1.[place]

## 2021-10-10 ENCOUNTER — HEALTH MAINTENANCE LETTER (OUTPATIENT)
Age: 61
End: 2021-10-10

## 2021-12-28 ENCOUNTER — IMMUNIZATION (OUTPATIENT)
Dept: NURSING | Facility: CLINIC | Age: 61
End: 2021-12-28
Payer: COMMERCIAL

## 2021-12-28 DIAGNOSIS — Z85.46 HISTORY OF PROSTATE CANCER: ICD-10-CM

## 2021-12-28 PROCEDURE — 90682 RIV4 VACC RECOMBINANT DNA IM: CPT

## 2021-12-28 PROCEDURE — 0004A PR COVID VAC PFIZER DIL RECON 30 MCG/0.3 ML IM: CPT

## 2021-12-28 PROCEDURE — 91300 PR COVID VAC PFIZER DIL RECON 30 MCG/0.3 ML IM: CPT

## 2021-12-28 PROCEDURE — 90471 IMMUNIZATION ADMIN: CPT

## 2021-12-30 RX ORDER — TAMSULOSIN HYDROCHLORIDE 0.4 MG/1
CAPSULE ORAL
Qty: 90 CAPSULE | Refills: 1 | Status: SHIPPED | OUTPATIENT
Start: 2021-12-30 | End: 2022-07-06

## 2021-12-30 NOTE — TELEPHONE ENCOUNTER
"Last Written Prescription Date:  1/7/21  Last Fill Quantity: 90,  # refills: 3   Last office visit provider:  4/15/21     Requested Prescriptions   Pending Prescriptions Disp Refills     tamsulosin (FLOMAX) 0.4 MG capsule [Pharmacy Med Name: TAMSULOSIN 0.4MG CAPSULES] 90 capsule 3     Sig: TAKE 1 CAPSULE BY MOUTH DAILY AFTER SUPPER       Alpha Blockers Passed - 12/28/2021 10:45 AM        Passed - Blood pressure under 140/90 in past 12 months     BP Readings from Last 3 Encounters:   04/02/21 114/70   12/29/20 110/80   11/01/19 120/78                 Passed - Recent (12 mo) or future (30 days) visit within the authorizing provider's specialty     Patient has had an office visit with the authorizing provider or a provider within the authorizing providers department within the previous 12 mos or has a future within next 30 days. See \"Patient Info\" tab in inbasket, or \"Choose Columns\" in Meds & Orders section of the refill encounter.              Passed - Patient does not have Tadalafil, Vardenafil, or Sildenafil on their medication list        Passed - Medication is active on med list        Passed - Patient is 18 years of age or older             Shayan Linder RN 12/30/21 1:36 PM  "

## 2022-01-30 ENCOUNTER — HEALTH MAINTENANCE LETTER (OUTPATIENT)
Age: 62
End: 2022-01-30

## 2022-02-21 DIAGNOSIS — G47.01 INSOMNIA DUE TO MEDICAL CONDITION: ICD-10-CM

## 2022-02-21 NOTE — TELEPHONE ENCOUNTER
OKAY TO WAIT FOR PCP PER PT    Reason for Call:  Medication or medication refill:    Do you use a [facility]?  Name of the pharmacy and phone number for the current request:  [email or web address]     Name of the medication requested:   ALPRAZolam (XANAX) 0.5 MG tablet 30 tablet 5 8/3/2021  No   Sig - Route: Take 1 tablet (0.5 mg) by mouth nightly as needed for sleep - Oral         Other request: na    Can we leave a detailed message on this number? YES    Phone number patient can be reached at: Cell number on file:    Telephone Information:   Mobile [phone]       Best Time: any    Call taken on 2/21/2022 at 12:14 PM by Pam J. Behr

## 2022-02-21 NOTE — TELEPHONE ENCOUNTER
Patient has an appointment with Dr. Zacarias on 3/22/22.        Medication: Alprazolam 0.5 mg      CSA in last year: NO    Random Utox in last year: NO    On opioids in addition to benzodiazepines? NO          PROVIDER TO PULL THE FOLLOWING FROM  :    1. Last date filled?  2.   Only PCP Prescribing?  3.   Taken as prescribed from physician notes?

## 2022-02-22 RX ORDER — ALPRAZOLAM 0.5 MG
0.5 TABLET ORAL
Qty: 30 TABLET | Refills: 0 | Status: SHIPPED | OUTPATIENT
Start: 2022-02-22 | End: 2022-03-22

## 2022-02-22 NOTE — TELEPHONE ENCOUNTER
Last CSA and utox  12/29/20  Non-opioid Controlled Substance Agreement  Diagnosis: insomnia  Medication : Alprazolam 0.5 mg nightly   Quantity per month: insomnia  Number of refills before follow up visit: 12    01/24/2022  3   08/03/2021  Alprazolam 0.5 MG Tablet    30.00  30 Ma Win   [id number]   [facility] ([facility]   5/5  1.[place]   12/27/2021  3   08/03/2021  Alprazolam 0.5 MG Tablet    30.00  30 Ma Win   [id number]   [facility] (5605)   4/5  1.[place]   11/30/2021  3   08/03/2021  Alprazolam 0.5 MG Tablet    30.00  30 Ma Win   [id number]   [facility] (5605)   3/5  1.[place]   10/04/2021  3   08/03/2021  Alprazolam 0.5 MG Tablet    30.00  30 Ma Win   [id number]   [facility] (5605)   2/5  1.[place]

## 2022-03-22 ENCOUNTER — OFFICE VISIT (OUTPATIENT)
Dept: FAMILY MEDICINE | Facility: CLINIC | Age: 62
End: 2022-03-22
Payer: COMMERCIAL

## 2022-03-22 VITALS
TEMPERATURE: 98 F | BODY MASS INDEX: 23.06 KG/M2 | SYSTOLIC BLOOD PRESSURE: 110 MMHG | DIASTOLIC BLOOD PRESSURE: 76 MMHG | HEART RATE: 57 BPM | OXYGEN SATURATION: 98 % | HEIGHT: 70 IN | WEIGHT: 161.1 LBS

## 2022-03-22 DIAGNOSIS — Z79.899 CONTROLLED SUBSTANCE AGREEMENT SIGNED: ICD-10-CM

## 2022-03-22 DIAGNOSIS — L21.9 SEBORRHEIC DERMATITIS OF SCALP: ICD-10-CM

## 2022-03-22 DIAGNOSIS — Z00.00 ANNUAL PHYSICAL EXAM: Primary | ICD-10-CM

## 2022-03-22 DIAGNOSIS — Z85.46 HISTORY OF PROSTATE CANCER: ICD-10-CM

## 2022-03-22 DIAGNOSIS — Z13.220 SCREENING, LIPID: ICD-10-CM

## 2022-03-22 DIAGNOSIS — G47.01 INSOMNIA DUE TO MEDICAL CONDITION: ICD-10-CM

## 2022-03-22 LAB
AMPHETAMINES UR QL SCN: NORMAL
BARBITURATES UR QL: NORMAL
BENZODIAZ UR QL: NORMAL
CANNABINOIDS UR QL SCN: NORMAL
CHOLEST SERPL-MCNC: 207 MG/DL
COCAINE UR QL: NORMAL
CREAT UR-MCNC: 15 MG/DL
FASTING STATUS PATIENT QL REPORTED: YES
HDLC SERPL-MCNC: 52 MG/DL
LDLC SERPL CALC-MCNC: 139 MG/DL
OPIATES UR QL SCN: NORMAL
OXYCODONE UR QL: NORMAL
PCP UR QL SCN: NORMAL
PSA SERPL-MCNC: 0.69 UG/L (ref 0–4.5)
TRIGL SERPL-MCNC: 80 MG/DL

## 2022-03-22 PROCEDURE — 90471 IMMUNIZATION ADMIN: CPT | Performed by: FAMILY MEDICINE

## 2022-03-22 PROCEDURE — G0103 PSA SCREENING: HCPCS | Performed by: FAMILY MEDICINE

## 2022-03-22 PROCEDURE — 36415 COLL VENOUS BLD VENIPUNCTURE: CPT | Performed by: FAMILY MEDICINE

## 2022-03-22 PROCEDURE — 80307 DRUG TEST PRSMV CHEM ANLYZR: CPT | Performed by: FAMILY MEDICINE

## 2022-03-22 PROCEDURE — 99396 PREV VISIT EST AGE 40-64: CPT | Mod: 25 | Performed by: FAMILY MEDICINE

## 2022-03-22 PROCEDURE — 90750 HZV VACC RECOMBINANT IM: CPT | Performed by: FAMILY MEDICINE

## 2022-03-22 RX ORDER — ALPRAZOLAM 0.5 MG
0.5 TABLET ORAL
Qty: 30 TABLET | Refills: 5 | Status: SHIPPED | OUTPATIENT
Start: 2022-03-22 | End: 2022-09-12

## 2022-03-22 RX ORDER — KETOCONAZOLE 20 MG/ML
SHAMPOO TOPICAL DAILY
Qty: 100 ML | Refills: 0 | Status: SHIPPED | OUTPATIENT
Start: 2022-03-22 | End: 2023-04-11 | Stop reason: ALTCHOICE

## 2022-03-22 NOTE — PROGRESS NOTES
"  ASSESSMENT/PLAN:   Tate was seen today for physical.    Diagnoses and all orders for this visit:    Annual physical exam    Insomnia due to medical condition  -     ALPRAZolam (XANAX) 0.5 MG tablet; Take 1 tablet (0.5 mg) by mouth nightly as needed for sleep No further refills without scheduled Follow up visit    Controlled substance agreement signed  For above medication  -     Urine Drugs of Abuse Screen Panel 1 - Drug Screen (Full)    Seborrheic dermatitis of scalp  -     ketoconazole (NIZORAL) 2 % external shampoo; Apply topically daily    History of prostate cancer  -     PSA, screen    Screening, lipid  -     Lipid panel reflex to direct LDL Fasting    Other orders  -     REVIEW OF HEALTH MAINTENANCE PROTOCOL ORDERS  -     ZOSTER VACCINE RECOMBINANT (Shingrix); Standing  -     ZOSTER VACCINE RECOMBINANT (Shingrix)      COUNSELING:   Special attention given to:        Vision screening       Colorectal cancer screening    Estimated body mass index is 23.12 kg/m  as calculated from the following:    Height as of this encounter: 1.778 m (5' 10\").    Weight as of this encounter: 73.1 kg (161 lb 1.6 oz).         He reports that he has never smoked. He has never used smokeless tobacco.        Sonali Zacarias MD  [facility]    SUBJECTIVE:   CC: Tate Brothers is an 61 year old male who presents for preventative health visit.     {Patient has been advised of split billing requirements and indicates understanding: Yes  Healthy Habits:     Getting at least 3 servings of Calcium per day:  Yes    Bi-annual eye exam:  NO    Dental care twice a year:  Yes    Sleep apnea or symptoms of sleep apnea:  None    Diet:  Regular (no restrictions)    Frequency of exercise:  4-5 days/week    Duration of exercise:  30-45 minutes    Taking medications regularly:  Yes    Medication side effects:  None    PHQ-2 Total Score: 0    Additional concerns today:  No    Diet - normal  Exercise- runs - " former long distance runner and  - runs as often as he can - careful about the ice.  Will go for along walk..  Was having trouble with pulled left calf muscle  - lately it has gotten better .  They went to [place]  2 weeks ago and had not trouble running there    Insomnia - Tate takes 0.5 mg alprazolam at night and has found this very helpful.  Will do  controlled substance agreement and urine screening today    History of prostate cancer 10 years ago - used to follow with urology, then releases.  Has been getting yearly PSA. No change I urinary habits - need to urinate frequently is ongoing    Occasional swallowing difficulties - had a previous endoscopy as part of [facility] (now [place])  - I attempted to get records through [employer] but system did not recognize his name. He notes that sometimes when he eats pretzels or something dry  - just feels like he has a big ball at top of stomach.  This doesn't happen if he drinks a lot of water. Happens about once a month. No ongoing abdominal pain    scalp dermatitis?  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   They have a son who lives in the [place]      Today's PHQ-2 Score:   PHQ-2 ( 1999 Pfizer) 3/22/2022   Q1: Little interest or pleasure in doing things 0   Q2: Feeling down, depressed or hopeless 0   PHQ-2 Score 0   Q1: Little interest or pleasure in doing things Not at all   Q2: Feeling down, depressed or hopeless Not at all   PHQ-2 Score 0       Abuse: Current or Past(Physical, Sexual or Emotional)- No  Do you feel safe in your environment? Yes        Social History     Tobacco Use     Smoking status: Never Smoker     Smokeless tobacco: Never Used   Substance Use Topics     Alcohol use:  "Yes     Alcohol/week: 7.0 - 14.0 standard drinks         Alcohol Use 3/22/2022   Prescreen: >3 drinks/day or >7 drinks/week? No       Last PSA:   Prostate Specific Antigen Screen   Date Value Ref Range Status   03/22/2022 0.69 0.00 - 4.50 ug/L Final       Reviewed orders with patient. Reviewed health maintenance and updated orders accordingly - Yes      Reviewed and updated as needed this visit by Provider                 Review of Systems  Constitutional: negative for recent illness or change in weight  Eyes: negative for irritation and vision change; changed to bifocals at last exam 2 years ago. Also, a few time in a day and then a couple of weeks later: felt like he was seeing a \"meteor\" in peripheral vision  Ears, nose, mouth, throat, and face: negative for nasal congestion and sore throat; wears hearing aids  Respiratory: negative for cough and dyspnea on exertion  Cardiovascular: negative for chest pain and palpitations  Gastrointestinal: negative for abdominal pain and change in bowel habits  Genitourinary:negative for dysuria, frequency and hesitancy  Integument/breast: negative for rash  Hematologic/lymphatic: negative for bleeding and easy bruising  Musculoskeletal:negative for arthralgias and myalgias  Neurological: negative for dizziness, headaches and paresthesia        OBJECTIVE:   /76 (BP Location: Left arm, Patient Position: Sitting, Cuff Size: Adult Large)   Pulse 57   Temp 98  F (36.7  C)   Ht 1.778 m (5' 10\")   Wt 73.1 kg (161 lb 1.6 oz)   SpO2 98%   BMI 23.12 kg/m      Physical Exam  General appearance - alert, well appearing, and in no distress  Mental status - normal mood, behavior, speech, dress, motor activity, and thought processes  Eyes - pupils equal and reactive, extraocular eye movements intact, funduscopic exam normal, discs flat and sharp  Ears - bilateral TM's and external ear canals normal  Mouth - mucous membranes moist, pharynx normal without lesions  Neck - supple, no " significant adenopathy, carotids upstroke normal bilaterally, no bruits, thyroid exam: thyroid is normal in size without nodules or tenderness  Chest - clear to auscultation, no wheezes, rales or rhonchi, symmetric air entry  Heart - normal rate, regular rhythm, normal S1, S2, no murmurs, rubs, clicks or gallops  Abdomen - soft, nontender, nondistended, no masses or organomegaly  Neurological - alert, oriented, normal speech, no focal findings or movement disorder noted, DTR's normal and symmetric  Extremities - peripheral pulses normal, no pedal edema, no clubbing or cyanosis  Skin - no rashes or worrisome lesions

## 2022-03-22 NOTE — LETTER
[facility]  03/22/22  Patient: Tate Brothers  YOB: 1960  Medical Record Number: [id number]                                                                                  Non-Opioid Controlled Substance Agreement    Diagnosis: insomnia  Medication : Alprazolam 0.5 mg nightly   Quantity per month: insomnia  Number of refills before follow up visit: 12    This is an agreement between you and your provider regarding safe and appropriate use of controlled substances prescribed by your care team. Controlled substances are?medicines that can cause physical and mental dependence (abuse).     There are strict laws about having and using these medicines. We here at [facility] are  committed to working with you in your efforts to get better. To support you in this work, we'll help you schedule regular office appointments for medicine refills. If we must cancel or change your appointment for any reason, we'll make sure you have enough medicine to last until your next appointment.     As a Provider, I will:     Listen carefully to your concerns while treating you with respect.     Recommend a treatment plan that I believe is in your best interest and may involve therapies other than medicine.      Talk with you often about the possible benefits and the risk of harm of any medicine that we prescribe for you.    Assess the safety of this medicine and check how well it works.      Provide a plan on how to taper (discontinue or go off) using this medicine if the decision is made to stop its use.      ::  As a Patient, I understand controlled substances:       Are prescribed by my care provider to help me function or work and manage my condition(s).?    Are strong medicines and can cause serious side effects.       Need to be taken exactly as prescribed.?Combining controlled substances with certain medicines or chemicals (such as illegal drugs, alcohol, sedatives, sleeping pills, and  benzodiazepines) can be dangerous or even fatal.? If I stop taking my medicines suddenly, I may have severe withdrawal symptoms.     The risks, benefits, and side effects of these medicine(s) were explained to me. I agree that:    1. I will take part in other treatments as advised by my care team. This may be psychiatry or counseling, physical therapy, behavioral therapy, group treatment or a referral to specialist.  2. I will keep all my appointments and understand this is part of the monitoring of controlled substances.?My care team may require an office visit for EVERY controlled substance refill. If I miss appointments or don t follow instructions, my care team may stop my medicine    3. I will take my medicines as prescribed. I will not change the dose or schedule unless my care team tells me to. There will be no refills if I run out early.    4. I may be asked to come to the clinic and complete a urine drug test or complete a pill count. If I don t give a urine sample or participate in a pill count, the care team may stop my medicine.  5. I will only receive controlled substance prescriptions from this clinic. If I am treated by another provider, I will tell them that I am taking controlled substances and that I have a treatment agreement with this provider. I will inform my [facility] care team within one business day if I am given a prescription for any controlled substance by another healthcare provider. My [facility] care team can contact other providers and pharmacists about my use of any medicines.  6. It is up to me to make sure that I don't run out of my medicines on weekends or holidays.?If my care team is willing to refill my prescription without a visit, I must request refills only during office hours. Refills may take up to 3 business days to process. I will use one pharmacy to fill all my controlled substance prescriptions. I will notify the clinic about any changes to my insurance  or medicine availability.  7. I am responsible for my prescriptions. If the medicine/prescription is lost, stolen or destroyed, it will not be replaced.?I also agree not to share controlled substance medicines with anyone.   8. I am aware I should not use any illegal or recreational drugs. I agree not to drink alcohol unless my care team says I can.   9. If I enroll in the [place] Medical Cannabis program, I will tell my care team before my next refill.  10. I will tell my care team right away if I become pregnant, have a new medical problem treated outside of my regular clinic, or have a change in my medicines.   11. I understand that this medicine can affect my thinking, judgment and reaction time.? Alcohol and drugs affect the brain and body, which can affect the safety of my driving. Being under the influence of alcohol or drugs can affect my decision-making, behaviors, personal safety and the safety of others. Driving while impaired (DWI) can occur if a person is driving, operating or in physical control of a car, motorcycle, boat, snowmobile, ATV, motorbike, off-road vehicle or any other motor vehicle ([place] Statute 169[place].20). I understand the risk if I choose to drive or operate any vehicle or machinery.    I understand that if I do not follow any of the conditions above, my prescriptions or treatment may be stopped or changed.   I agree that my provider, clinic care team and pharmacy may work with any city, state or federal law enforcement agency that investigates the misuse, sale or other diversion of my controlled medicine. I will allow my provider to discuss my care with, or share a copy of, this agreement with any other treating provider, pharmacy or emergency room where I receive care.     I have read this agreement and have asked questions about anything I did not understand.    ________________________________________________________  Patient Signature - Tate Brothers     ___________________                    Date     ________________________________________________________  Provider Signature - Lesly Houston, MD       ___________________                   Date     ________________________________________________________  Witness Signature (required if provider not present while patient signing)          ___________________                   Date

## 2022-03-24 DIAGNOSIS — Z85.46 HISTORY OF PROSTATE CANCER: ICD-10-CM

## 2022-03-24 DIAGNOSIS — R97.20 INCREASING PROSTATE SPECIFIC ANTIGEN LEVEL: Primary | ICD-10-CM

## 2022-04-01 DIAGNOSIS — Z85.46 HISTORY OF PROSTATE CANCER: ICD-10-CM

## 2022-04-04 RX ORDER — TAMSULOSIN HYDROCHLORIDE 0.4 MG/1
CAPSULE ORAL
Qty: 90 CAPSULE | Refills: 1 | OUTPATIENT
Start: 2022-04-04

## 2022-05-27 ENCOUNTER — NURSE TRIAGE (OUTPATIENT)
Dept: NURSING | Facility: CLINIC | Age: 62
End: 2022-05-27

## 2022-05-27 DIAGNOSIS — U07.1 INFECTION DUE TO 2019 NOVEL CORONAVIRUS: Primary | ICD-10-CM

## 2022-05-27 RX ORDER — BENZONATATE 100 MG/1
100 CAPSULE ORAL 3 TIMES DAILY PRN
Qty: 30 CAPSULE | Refills: 0 | Status: SHIPPED | OUTPATIENT
Start: 2022-05-27 | End: 2023-04-11

## 2022-05-27 NOTE — TELEPHONE ENCOUNTER
Took some ibuprofen    Started Tuesday with chills  Wedesday fever and chills - temp 104 - down to 99 with ibuprofen  Feer did go down   Body aches  phlegmy cough  Sore throat - started late late Wednesday to Thursday  Headache - not too bad  No shortness of breath    Had diarrhea on Saturday and Sunday then better with this by Monday

## 2022-05-27 NOTE — CONFIDENTIAL NOTE
Positive for Covid-19 on 05/26/22 via home test  Returned on 05/26/22 from [place].   Symptoms started on Tuesday, for three day  Temp is 104 by mouth, severe sore throat, cough, with  Mucous, body aches.  patient is fully vaccinated with one booster  History of prostate cancer  Triage guidelines recommend to discuss with pcp and callback by nurse within 1 hour  Caller verbalized and understands directives.  COVID 19 Nurse Triage Plan/Patient Instructions    Please be aware that novel coronavirus (COVID-19) may be circulating in the community. If you develop symptoms such as fever, cough, or SOB or if you have concerns about the presence of another infection including coronavirus (COVID-19), please contact your health care provider or visit [email or web address].     Disposition/Instructions    In-Person Visit with provider recommended. Reference Visit Selection Guide.    Thank you for taking steps to prevent the spread of this virus.  o Limit your contact with others.  o Wear a simple mask to cover your cough.  o Wash your hands well and often.    Resources    [facility]: About COVID-19: [email or web address]    CDC: What to Do If You're Sick: www.cdc.gov/coronavirus/2019-ncov/about/steps-when-sick.html    CDC: Ending Home Isolation: www.cdc.gov/coronavirus/2019-ncov/hcp/disposition-in-home-patients.html     CDC: Caring for Someone: www.cdc.gov/coronavirus/2019-ncov/if-you-are-sick/care-for-someone.html     [facility]: Interim Guidance for Hospital Discharge to Home: www.health.[facility].mn.us/diseases/coronavirus/hcp/hospdischarge.pdf    [facility] clinical trials (COVID-19 research studies): clinicalaffairs.[facility]/umn-clinical-trials     Below are the COVID-19 hotlines at the [place] Department of Health ([facility]). Interpreters are available.   o For health questions: Call 277-774-9264 or 1-139.843.2661 (7 a.m. to 7 p.m.)  o For questions about schools and childcare: Call 968-136-4715  or 1-156.666.1862 (7 a.m. to 7 p.m.)

## 2022-05-31 ENCOUNTER — NURSE TRIAGE (OUTPATIENT)
Dept: NURSING | Facility: CLINIC | Age: 62
End: 2022-05-31

## 2022-05-31 NOTE — TELEPHONE ENCOUNTER
Wife April is calling as Tate has covid.  Patient is taking Molnupiravir and wife states that fever is still present every day around 100 to 102.  Symptoms started on May 24th, 2022.  Patient is taking tylenol for fever.  Denies shortness of breath.  Patient can touch his chin to his chest.  Patient is urinating once every eight hours.  Temperature today is 101.1 orally.  Patient does not wish to set up virtual visit with MD.  Wife and patient have questions on fever.  Patient states that he will continue to monitor and phone back if necessary if any changes.      COVID 19 Nurse Triage Plan/Patient Instructions    Please be aware that novel coronavirus (COVID-19) may be circulating in the community. If you develop symptoms such as fever, cough, or SOB or if you have concerns about the presence of another infection including coronavirus (COVID-19), please contact your health care provider or visit [email or web address].     Disposition/Instructions    Virtual Visit with provider recommended. Reference Visit Selection Guide.    Thank you for taking steps to prevent the spread of this virus.  o Limit your contact with others.  o Wear a simple mask to cover your cough.  o Wash your hands well and often.    Resources    [facility]: About COVID-19: [email or web address]    CDC: What to Do If You're Sick: www.cdc.gov/coronavirus/2019-ncov/about/steps-when-sick.html    CDC: Ending Home Isolation: www.cdc.gov/coronavirus/2019-ncov/hcp/disposition-in-home-patients.html     CDC: Caring for Someone: www.cdc.gov/coronavirus/2019-ncov/if-you-are-sick/care-for-someone.html     [facility]: Interim Guidance for Hospital Discharge to Home: www.health.[facility].mn.us/diseases/coronavirus/hcp/hospdischarge.pdf    [facility] clinical trials (COVID-19 research studies): clinicalaffairs.[facility]/umn-clinical-trials     Below are the COVID-19 hotlines at the [place] Department of Health ([facility]). Interpreters are  available.   o For health questions: Call 632-210-9697 or 1-329.272.9240 (7 a.m. to 7 p.m.)  o For questions about schools and childcare: Call 129-332-7456 or 1-312.517.4387 (7 a.m. to 7 p.m.)                       Reason for Disposition    Fever > 101 F (38.3 C) and over 60 years of age    Additional Information    Negative: Difficult to awaken or acting confused (e.g., disoriented, slurred speech)    Negative: Pale cold skin and very weak (can't stand)    Negative: Difficulty breathing and bluish (or gray) lips or face    Negative: New onset rash with purple (or blood-colored) spots or dots    Negative: Sounds like a life-threatening emergency to the triager    Negative: Headache and stiff neck (can't touch chin to chest)    Negative: Difficulty breathing    Negative: IV drug abuse    Negative: Fever > 103 F (39.4 C)    Protocols used: FEVER-A-OH

## 2022-07-09 ENCOUNTER — NURSE TRIAGE (OUTPATIENT)
Dept: NURSING | Facility: CLINIC | Age: 62
End: 2022-07-09

## 2022-07-09 NOTE — TELEPHONE ENCOUNTER
Nurse Triage SBAR    Is this a 2nd Level Triage? NO    Situation: Pt calling with questions about the COVID-19 vaccine.  Pt scheduled his second COVID vaccine booster for today and questions if this is okay considering he had COVID one month ago and took Paxlovid.      Education given, advised pt that as long as his isolation period has ended and he's feeling better, he can get the booster vaccine.  Also advised him to notify the pharmacist, who will be giving pt his vaccine, that he recently had Paxlovid.      Patient verbalized understanding and had no further questions.    Carrie Bolivar RN  [facility] Nurse Advisor      COVID 19 Nurse Triage Plan/Patient Instructions    Please be aware that novel coronavirus (COVID-19) may be circulating in the community. If you develop symptoms such as fever, cough, or SOB or if you have concerns about the presence of another infection including coronavirus (COVID-19), please contact your health care provider or visit [email or web address].     Disposition/Instructions    Home care recommended. Follow home care protocol based instructions.    Thank you for taking steps to prevent the spread of this virus.  o Limit your contact with others.  o Wear a simple mask to cover your cough.  o Wash your hands well and often.    Resources    [facility]: About COVID-19: [email or web address]    CDC: What to Do If You're Sick: www.cdc.gov/coronavirus/2019-ncov/about/steps-when-sick.html    CDC: Ending Home Isolation: www.cdc.gov/coronavirus/2019-ncov/hcp/disposition-in-home-patients.html     CDC: Caring for Someone: www.cdc.gov/coronavirus/2019-ncov/if-you-are-sick/care-for-someone.html     [facility]: Interim Guidance for Hospital Discharge to Home: www.health.[facility].mn.us/diseases/coronavirus/hcp/hospdischarge.pdf    [facility] clinical trials (COVID-19 research studies): clinicalaffairs.[facility]/umn-clinical-trials     Below are the  COVID-19 hotlines at the [place] Department of Health ([facility]). Interpreters are available.   o For health questions: Call 493-017-0364 or 1-331.107.7771 (7 a.m. to 7 p.m.)  o For questions about schools and childcare: Call 576-858-9397 or 1-333.874.9954 (7 a.m. to 7 p.m.)       Reason for Disposition    Health Information question, no triage required and triager able to answer question    Additional Information    Negative: [1] Caller is not with the adult (patient) AND [2] reporting urgent symptoms    Negative: Lab result questions    Negative: Medication questions    Negative: Caller can't be reached by phone    Negative: Caller has already spoken to PCP or another triager    Negative: RN needs further essential information from caller in order to complete triage    Negative: Requesting regular office appointment    Negative: [1] Caller requesting NON-URGENT health information AND [2] PCP's office is the best resource    Protocols used: INFORMATION ONLY CALL - NO TRIAGE-A-

## 2022-09-08 ENCOUNTER — TELEPHONE (OUTPATIENT)
Dept: FAMILY MEDICINE | Facility: CLINIC | Age: 62
End: 2022-09-08

## 2022-09-08 DIAGNOSIS — G47.01 INSOMNIA DUE TO MEDICAL CONDITION: ICD-10-CM

## 2022-09-08 NOTE — TELEPHONE ENCOUNTER
Medication Question or Refill        What medication are you calling about (include dose and sig)?:   ALPRAZolam (XANAX) 0.5 MG tablet 30 tablet 5 3/22/2022  No   Sig - Route: Take 1 tablet (0.5 mg) by mouth nightly as needed for sleep No further refills without scheduled Follow up visit - Oral         Controlled Substance Agreement on file:   CSA -- Patient Level:    Controlled Substance Agreement - Non - Opioid - Scan on 12/31/2020  Controlled Substance Agreement - Non - Opioid - Scan on 11/10/2019: NON-OPIOID CONTROLLED SUBSTANCE AGREEMENT       }    Preferred Pharmacy:     [id number] - [place], [place] - [address] AT [facility] [address]  [place] [address]  Phone: [phone] Fax: [phone]      Could we send this information to you in [place] or would you prefer to receive a phone call?:   Patient would prefer a phone call   Okay to leave a detailed message?: Yes at Cell number on file:    Telephone Information:   Mobile [phone]

## 2022-09-09 ENCOUNTER — MYC MEDICAL ADVICE (OUTPATIENT)
Dept: FAMILY MEDICINE | Facility: CLINIC | Age: 62
End: 2022-09-09

## 2022-09-09 NOTE — TELEPHONE ENCOUNTER
Patient called back and was wondering if he can possibly get a refill by today or tomorrow as he is leaving out of town on Monday.

## 2022-09-11 DIAGNOSIS — G47.01 INSOMNIA DUE TO MEDICAL CONDITION: ICD-10-CM

## 2022-09-12 RX ORDER — ALPRAZOLAM 0.5 MG
0.5 TABLET ORAL
Qty: 30 TABLET | Refills: 0 | Status: SHIPPED | OUTPATIENT
Start: 2022-09-12 | End: 2023-04-03

## 2022-09-12 RX ORDER — ALPRAZOLAM 0.5 MG
TABLET ORAL
Qty: 30 TABLET | Refills: 5 | Status: SHIPPED | OUTPATIENT
Start: 2022-09-12 | End: 2023-04-11 | Stop reason: DRUGHIGH

## 2022-09-24 ENCOUNTER — HEALTH MAINTENANCE LETTER (OUTPATIENT)
Age: 62
End: 2022-09-24

## 2022-09-30 DIAGNOSIS — Z85.46 HISTORY OF PROSTATE CANCER: ICD-10-CM

## 2022-09-30 RX ORDER — TAMSULOSIN HYDROCHLORIDE 0.4 MG/1
CAPSULE ORAL
Qty: 90 CAPSULE | Refills: 0 | Status: SHIPPED | OUTPATIENT
Start: 2022-09-30 | End: 2022-12-26

## 2022-10-01 NOTE — TELEPHONE ENCOUNTER
"Last Written Prescription Date:  7/6/2022  Last Fill Quantity: 90,  # refills: 0   Last office visit provider:  3/22/2022     Requested Prescriptions   Pending Prescriptions Disp Refills     tamsulosin (FLOMAX) 0.4 MG capsule [Pharmacy Med Name: TAMSULOSIN 0.4MG CAPSULES] 90 capsule 0     Sig: TAKE 1 CAPSULE BY MOUTH DAILY AFTER DINNER       Alpha Blockers Passed - 9/30/2022  9:20 AM        Passed - Blood pressure under 140/90 in past 12 months     BP Readings from Last 3 Encounters:   03/22/22 110/76   04/02/21 114/70   12/29/20 110/80                 Passed - Recent (12 mo) or future (30 days) visit within the authorizing provider's specialty     Patient has had an office visit with the authorizing provider or a provider within the authorizing providers department within the previous 12 mos or has a future within next 30 days. See \"Patient Info\" tab in inbasket, or \"Choose Columns\" in Meds & Orders section of the refill encounter.              Passed - Patient does not have Tadalafil, Vardenafil, or Sildenafil on their medication list        Passed - Medication is active on med list        Passed - Patient is 18 years of age or older             Leann Talamantes RN 09/30/22 7:33 PM  "

## 2022-10-05 ENCOUNTER — TELEPHONE (OUTPATIENT)
Dept: FAMILY MEDICINE | Facility: CLINIC | Age: 62
End: 2022-10-05

## 2022-10-05 NOTE — TELEPHONE ENCOUNTER
General Call      Reason for Call:  Pt asking for an early pickup of Alprazolam -  Pharmacy stating they cannot release until 10/11/2022    Please advise    What are your questions or concerns:  n/a    Date of last appointment with provider: n/a    Could we send this information to you in [facility] or would you prefer to receive a phone call?:   Patient would prefer a phone call   Okay to leave a detailed message?: Yes at Home number on file [phone] (home)

## 2022-10-05 NOTE — TELEPHONE ENCOUNTER
"I need a reason why he should pick this up early - please call him  . Also he should be aware that even if he picks this up early, it will not change the date he is next due for a refill - which would be 11/11/22     Prescription below should  Has signed CSA, including:    \"I will take my medicines as prescribed. I will not change the dose or schedule unless my care team tells me to. There will be no refills if I run out early.  \"    Diagnosis: insomnia  Medication : Alprazolam 0.5 mg nightly   Quantity per month: insomnia  Number of refills before follow up visit: 12    ALPRAZolam (XANAX) 0.5 MG tablet 30 tablet 5 9/12/2022  No   Sig: TAKE 1 TABLET(0.5 MG) BY MOUTH EVERY NIGHT AS NEEDED FOR SLEEP. FOLLOW UP VISIT   Sent to pharmacy as: ALPRAZolam 0.5 MG Oral Tablet (XANAX)   Class: E-Prescribe   Order: [id number]   E-Prescribing Status: Receipt confirmed by pharmacy (9/12/2022  3:00 PM CDT)       "

## 2022-10-06 ENCOUNTER — MYC MEDICAL ADVICE (OUTPATIENT)
Dept: FAMILY MEDICINE | Facility: CLINIC | Age: 62
End: 2022-10-06

## 2022-10-18 ENCOUNTER — OFFICE VISIT (OUTPATIENT)
Dept: FAMILY MEDICINE | Facility: CLINIC | Age: 62
End: 2022-10-18
Payer: COMMERCIAL

## 2022-10-18 VITALS
BODY MASS INDEX: 22.75 KG/M2 | TEMPERATURE: 98.5 F | SYSTOLIC BLOOD PRESSURE: 135 MMHG | WEIGHT: 158.9 LBS | HEIGHT: 70 IN | OXYGEN SATURATION: 100 % | HEART RATE: 55 BPM | DIASTOLIC BLOOD PRESSURE: 76 MMHG | RESPIRATION RATE: 16 BRPM

## 2022-10-18 DIAGNOSIS — Z85.46 HISTORY OF PROSTATE CANCER: ICD-10-CM

## 2022-10-18 DIAGNOSIS — H93.A3 PULSATILE TINNITUS OF BOTH EARS: ICD-10-CM

## 2022-10-18 DIAGNOSIS — Z23 HIGH PRIORITY FOR 2019-NCOV VACCINE: ICD-10-CM

## 2022-10-18 DIAGNOSIS — F41.9 ANXIETY: Primary | ICD-10-CM

## 2022-10-18 DIAGNOSIS — Z82.49 FAMILY HISTORY OF CORONARY ARTERY DISEASE: ICD-10-CM

## 2022-10-18 LAB — PSA SERPL-MCNC: 0.52 NG/ML (ref 0–4.5)

## 2022-10-18 PROCEDURE — 0124A COVID-19,PF,PFIZER BOOSTER BIVALENT: CPT | Performed by: FAMILY MEDICINE

## 2022-10-18 PROCEDURE — 90682 RIV4 VACC RECOMBINANT DNA IM: CPT | Performed by: FAMILY MEDICINE

## 2022-10-18 PROCEDURE — 99214 OFFICE O/P EST MOD 30 MIN: CPT | Mod: 25 | Performed by: FAMILY MEDICINE

## 2022-10-18 PROCEDURE — 36415 COLL VENOUS BLD VENIPUNCTURE: CPT | Performed by: FAMILY MEDICINE

## 2022-10-18 PROCEDURE — G0103 PSA SCREENING: HCPCS | Performed by: FAMILY MEDICINE

## 2022-10-18 PROCEDURE — 91312 COVID-19,PF,PFIZER BOOSTER BIVALENT: CPT | Performed by: FAMILY MEDICINE

## 2022-10-18 PROCEDURE — 90471 IMMUNIZATION ADMIN: CPT | Performed by: FAMILY MEDICINE

## 2022-10-18 RX ORDER — ALPRAZOLAM 0.5 MG
.5-1 TABLET ORAL AT BEDTIME
Qty: 35 TABLET | Refills: 2 | Status: SHIPPED | OUTPATIENT
Start: 2022-11-07 | End: 2023-04-11

## 2022-10-18 NOTE — PROGRESS NOTES
Assessment & Plan     Anxiety  Slight increase in the monthly amount, so that he can take an extra half pill in the evening if he needs it  - ALPRAZolam (XANAX) 0.5 MG tablet  Dispense: 35 tablet; Refill: 2 (this makes the total of the last 6 month prescription)      He also notes that he may be traveling in the [place] and worries about running out of pills.  I did say that he could either get his prescription transferred, or message me and I will send it to a pharmacy near him if needed    Pulsatile tinnitus of both ears  This is new and is also interfering with his ability to sleep at times.  He says it sounds like he's hearing a dryer in the next room.  Discussed sometimes vascular cause for this and recommended he try  to avoid extending his neck backwards when he sleeping.  - Adult ENT  Referral  - ALPRAZolam (XANAX) 0.5 MG tablet  Dispense: 35 tablet; Refill: 2    High priority for 2019-nCoV vaccine  - COVID-19,PF,PFIZER BOOSTER BIVALENT 12+Yrs    Family history of coronary artery disease  He is healthy, runs daily, and does not have high cholesterol or blood pressure.  However his father did die of a heart attack.  He is wondering about further work-up.  Discussed that this study below may or may not be covered, but is relatively inexpensive and allows us to triage whether he should take a statin or have further work-up  - CT Coronary Calcium Scan    History of prostate cancer  - PSA, screen          Return in about 5 months (around 3/18/2023) for Routine preventive, or earlier as needed.    Sonali Zacarias MD  [facility]    Brandon Ybarra is a 62 year old accompanied by his alone, presenting for the following health issues:  psa level check, heart screening, Recheck Medication (Pt reports that he may want a flu shot.), and Imm/Inj (COVID-19 VACCINE)    Anxiety/Tinnitus  Tate is bothered by tinnitus.  He has had no change in his hearing and says the  "hearing aids he initially got when he lived in [place] are helpful with the tinnitus.  This issue has been progressive since .  He has a ringing sound and a hissing sound.     Then two months ago started getting rumbling \"helicopter\" sound - that is in both ears.  It's not as bad as the ringing sound - but \"it stresses you out.\" If he forgets to take his alprazolam, he wakes up anxious - this pulsatile tinnitus makes his anxiety worse, and sometimes he finds he need to take an and extra half pill    His ENT says there is nothing he can do about the tinnitus.  He has seen a chiropractor who suggested things that do not work.    He tried sleep stories, calming sounds - made things worse    ---  Tate had Covid-19 during the third week of May - couldn't swallow for 10 days. Had molnuparovir? [place] achy and horrible. Fever 104.2 - then it got better.  Lost 10 pounds    Heart screening? Never had a stress test   - exercises 5 days weekly   - dad  of a heart attack at age 67   - has a brother 73 fine   - runs 5 miles daily    History  prostate cancer - last PSA was normal but up a little - recheck today    ---    He eats 2-3 servings of fruits and vegetables daily.He consumes 1 sweetened beverage(s) daily.He exercises with enough effort to increase his heart rate 30 to 60 minutes per day.  He exercises with enough effort to increase his heart rate 5 days per week.   He is taking medications regularly.          Objective    /76 (BP Location: Left arm, Patient Position: Sitting, Cuff Size: Adult Regular)   Pulse 55   Temp 98.5  F (36.9  C) (Oral)   Resp 16   Ht 1.778 m (5' 10\")   Wt 72.1 kg (158 lb 14.4 oz)   SpO2 100%   BMI 22.80 kg/m    Body mass index is 22.8 kg/m .  Physical Exam   General appearance - alert, well appearing, and in no distress  Mental status - normal mood, behavior, speech, dress, motor activity, and thought processes  Ears - bilateral TM's and external ear canals normal  Neck - " supple, no significant adenopathy, carotids upstroke normal bilaterally, no bruits  Chest - clear to auscultation, no wheezes, rales or rhonchi, symmetric air entry  Heart - normal rate, regular rhythm, normal S1, S2, no murmurs, rubs, clicks or gallops  Abdomen - soft, nontender, nondistended, no masses or organomegaly  Extremities - peripheral pulses normal, no pedal edema, no clubbing or cyanosis

## 2022-11-01 ENCOUNTER — HOSPITAL ENCOUNTER (OUTPATIENT)
Dept: CT IMAGING | Facility: CLINIC | Age: 62
Discharge: HOME | End: 2022-11-01
Attending: FAMILY MEDICINE | Admitting: FAMILY MEDICINE

## 2022-11-01 DIAGNOSIS — Z82.49 FAMILY HISTORY OF CORONARY ARTERY DISEASE: ICD-10-CM

## 2022-11-01 PROCEDURE — 75571 CT HRT W/O DYE W/CA TEST: CPT

## 2022-11-01 PROCEDURE — 75571 CT HRT W/O DYE W/CA TEST: CPT | Mod: 26 | Performed by: INTERNAL MEDICINE

## 2022-11-16 ENCOUNTER — MYC MEDICAL ADVICE (OUTPATIENT)
Dept: FAMILY MEDICINE | Facility: CLINIC | Age: 62
End: 2022-11-16

## 2022-11-16 DIAGNOSIS — R93.1 ELEVATED CORONARY ARTERY CALCIUM SCORE: Primary | ICD-10-CM

## 2022-11-16 DIAGNOSIS — E78.5 HYPERLIPIDEMIA LDL GOAL <70: ICD-10-CM

## 2022-11-16 DIAGNOSIS — R93.1 ELEVATED CORONARY ARTERY CALCIUM SCORE: ICD-10-CM

## 2022-11-16 RX ORDER — ROSUVASTATIN CALCIUM 10 MG/1
10 TABLET, COATED ORAL DAILY
Qty: 30 TABLET | Refills: 0 | Status: SHIPPED | OUTPATIENT
Start: 2022-11-16 | End: 2022-11-17

## 2022-11-17 NOTE — TELEPHONE ENCOUNTER
"Routing refill request to provider for review/approval because:  Patient requesting 90 day supply.  Outside protocol window to alter this script.    Last Written Prescription Date:  11/16/22  Last Fill Quantity: 30,  # refills: 0   Last office visit provider:  10/18/22     Requested Prescriptions   Pending Prescriptions Disp Refills     rosuvastatin (CRESTOR) 10 MG tablet [Pharmacy Med Name: ROSUVASTATIN 10MG TABLETS] 90 tablet      Sig: TAKE 1 TABLET BY MOUTH ONCE DAILY FOR 30 DAYS. PLEASE LET PCP KNOWS HOW THIS GOES.       Statins Protocol Passed - 11/17/2022 10:28 AM        Passed - LDL on file in past 12 months     Recent Labs   Lab Test 03/22/22  1110   *             Passed - No abnormal creatine kinase in past 12 months     No lab results found.             Passed - Recent (12 mo) or future (30 days) visit within the authorizing provider's specialty     Patient has had an office visit with the authorizing provider or a provider within the authorizing providers department within the previous 12 mos or has a future within next 30 days. See \"Patient Info\" tab in inbasket, or \"Choose Columns\" in Meds & Orders section of the refill encounter.              Passed - Medication is active on med list        Passed - Patient is age 18 or older             Shayan Linder RN 11/17/22 10:33 AM  "

## 2022-11-18 RX ORDER — ROSUVASTATIN CALCIUM 10 MG/1
TABLET, COATED ORAL
Qty: 90 TABLET | Refills: 0 | Status: SHIPPED | OUTPATIENT
Start: 2022-11-18 | End: 2023-03-27

## 2022-12-07 ENCOUNTER — OFFICE VISIT (OUTPATIENT)
Dept: AUDIOLOGY | Facility: CLINIC | Age: 62
End: 2022-12-07
Attending: FAMILY MEDICINE
Payer: COMMERCIAL

## 2022-12-07 ENCOUNTER — OFFICE VISIT (OUTPATIENT)
Dept: OTOLARYNGOLOGY | Facility: CLINIC | Age: 62
End: 2022-12-07
Attending: FAMILY MEDICINE
Payer: COMMERCIAL

## 2022-12-07 DIAGNOSIS — H90.3 SENSORINEURAL HEARING LOSS (SNHL) OF BOTH EARS: Primary | ICD-10-CM

## 2022-12-07 DIAGNOSIS — H93.13 TINNITUS OF BOTH EARS: ICD-10-CM

## 2022-12-07 DIAGNOSIS — H90.3 SENSORINEURAL HEARING LOSS, ASYMMETRICAL: Primary | ICD-10-CM

## 2022-12-07 PROCEDURE — 92567 TYMPANOMETRY: CPT | Performed by: AUDIOLOGIST

## 2022-12-07 PROCEDURE — 92557 COMPREHENSIVE HEARING TEST: CPT | Performed by: AUDIOLOGIST

## 2022-12-07 PROCEDURE — 99243 OFF/OP CNSLTJ NEW/EST LOW 30: CPT | Performed by: OTOLARYNGOLOGY

## 2022-12-07 RX ORDER — ZOSTER VACCINE RECOMBINANT, ADJUVANTED 50 MCG/0.5
KIT INTRAMUSCULAR
COMMUNITY
Start: 2022-07-09

## 2022-12-07 RX ORDER — BNT162B2 0.23 MG/2.25ML
INJECTION, SUSPENSION INTRAMUSCULAR
COMMUNITY
Start: 2022-07-09

## 2022-12-07 NOTE — PROGRESS NOTES
HPI: This patient is a 63yo M who presents for evaluation of tinnitus at the request of Dr. Zacarias. There has been a gradual loss of hearing over the past several years and has been in HAs already for quite some time. Has known that the left ear is worse than the right, previously lived in [place], [place] where care was delivered before. The referral was made for pulsatile tinnitus, but on questioning, it is not pulsatile. He hears a rumbling at night when he takes out his HAs. Sometimes this inhibits his ability to sleep. When he remembers to take his [facility]anax at [facility] to sleep, it isn't an issue.     Past medical history, surgical history, social history, family history, medications, and allergies have been reviewed with the patient and are documented above.    Review of Systems: a 10-system review was performed. Pertinent positives are noted in the HPI and on a separate scanned document in the chart.    PHYSICAL EXAMINATION:  GEN: no acute distress, normocephalic  EYES: extraocular movements are intact, pupils are equal and round. Sclera clear.   EARS: auricles are normally formed. The external auditory canals are clear with minimal to no cerumen. Tympanic membranes are intact bilaterally with no signs of infection, effusion, retractions, or perforations.  NOSE: anterior nares are patent. There are no masses or lesions. The septum is non-obstructing.  OC/OP: clear, dentition is in good repair. The tongue and palate are fully mobile and symmetric. No masses or lesions.  NECK: soft and supple. No lymphadenopathy or masses. Airway is midline.  NEURO: CN VII and XII symmetric. alert and oriented. No spontaneous nystagmus. Gait is normal.  PULM: breathing comfortably on room air, normal chest expansion with respiration  CARDS: no cyanosis or clubbing, normal carotid pulses    AUDIOGRAM: mild to moderate SNHL bilaterally, slightly worse in the left ear. Type A tymps. WRS good.     MEDICAL DECISION-MAKING: This patient is a  61yo M with longstanding asymmetric sensorineural hearing loss, already wearing HAs. The tinnitus is a symptom of his hearing loss and will be more noticeable when he is not wearing his HAs (like at night when trying to go to sleep). His symptoms are improved with sleep aids. Reassurance given.

## 2022-12-07 NOTE — PROGRESS NOTES
AUDIOLOGY REPORT    SUMMARY: Audiology visit completed. See audiogram for results.      RECOMMENDATIONS: Follow-up with ENT.    Vera Arevalo, CCC-A  [place] Licensed Audiologist #[id number]

## 2022-12-07 NOTE — LETTER
12/7/2022         RE: Tate Brothers  [address] [place] [address]        Dear Colleague,    Thank you for referring your patient, Tate Brothers, to the [facility]. Please see a copy of my visit note below.    HPI: This patient is a 63yo M who presents for evaluation of tinnitus at the request of Dr. Zacarias. There has been a gradual loss of hearing over the past several years and has been in HAs already for quite some time. Has known that the left ear is worse than the right, previously lived in [place], [place] where care was delivered before. The referral was made for pulsatile tinnitus, but on questioning, it is not pulsatile. He hears a rumbling at night when he takes out his HAs. Sometimes this inhibits his ability to sleep. When he remembers to take his xanax at [facility] to sleep, it isn't an issue.     Past medical history, surgical history, social history, family history, medications, and allergies have been reviewed with the patient and are documented above.    Review of Systems: a 10-system review was performed. Pertinent positives are noted in the HPI and on a separate scanned document in the chart.    PHYSICAL EXAMINATION:  GEN: no acute distress, normocephalic  EYES: extraocular movements are intact, pupils are equal and round. Sclera clear.   EARS: auricles are normally formed. The external auditory canals are clear with minimal to no cerumen. Tympanic membranes are intact bilaterally with no signs of infection, effusion, retractions, or perforations.  NOSE: anterior nares are patent. There are no masses or lesions. The septum is non-obstructing.  OC/OP: clear, dentition is in good repair. The tongue and palate are fully mobile and symmetric. No masses or lesions.  NECK: soft and supple. No lymphadenopathy or masses. Airway is midline.  NEURO: CN VII and XII symmetric. alert and oriented. No spontaneous nystagmus. Gait is normal.  PULM: breathing comfortably on  room air, normal chest expansion with respiration  CARDS: no cyanosis or clubbing, normal carotid pulses    AUDIOGRAM: mild to moderate SNHL bilaterally, slightly worse in the left ear. Type A tymps. WRS good.     MEDICAL DECISION-MAKING: This patient is a 63yo M with longstanding asymmetric sensorineural hearing loss, already wearing HAs. The tinnitus is a symptom of his hearing loss and will be more noticeable when he is not wearing his HAs (like at night when trying to go to sleep). His symptoms are improved with sleep aids. Reassurance given.        Again, thank you for allowing me to participate in the care of your patient.        Sincerely,        Fabiana Quigley MD

## 2022-12-16 DIAGNOSIS — R93.1 ELEVATED CORONARY ARTERY CALCIUM SCORE: ICD-10-CM

## 2022-12-16 DIAGNOSIS — E78.5 HYPERLIPIDEMIA LDL GOAL <70: ICD-10-CM

## 2022-12-17 RX ORDER — ROSUVASTATIN CALCIUM 10 MG/1
TABLET, COATED ORAL
Qty: 90 TABLET | Refills: 0 | OUTPATIENT
Start: 2022-12-17

## 2022-12-22 DIAGNOSIS — E78.5 HYPERLIPIDEMIA LDL GOAL <70: ICD-10-CM

## 2022-12-22 DIAGNOSIS — R93.1 ELEVATED CORONARY ARTERY CALCIUM SCORE: ICD-10-CM

## 2022-12-23 RX ORDER — ROSUVASTATIN CALCIUM 10 MG/1
TABLET, COATED ORAL
Qty: 30 TABLET | OUTPATIENT
Start: 2022-12-23

## 2022-12-23 NOTE — TELEPHONE ENCOUNTER
"Needs rview    Last Written Prescription Date:  11/18/22  Last Fill Quantity: 90,  # refills: 0   Last office visit provider:  10/18/22     Requested Prescriptions   Pending Prescriptions Disp Refills     rosuvastatin (CRESTOR) 10 MG tablet [Pharmacy Med Name: ROSUVASTATIN 10MG TABLETS] 30 tablet      Sig: TAKE 1 TABLET BY MOUTH ONCE DAILY FOR 30 DAYS. PLEASE LET PCP KNOWS HOW THIS GOES.       Statins Protocol Passed - 12/22/2022  8:32 PM        Passed - LDL on file in past 12 months     Recent Labs   Lab Test 03/22/22  1110   *             Passed - No abnormal creatine kinase in past 12 months     No lab results found.             Passed - Recent (12 mo) or future (30 days) visit within the authorizing provider's specialty     Patient has had an office visit with the authorizing provider or a provider within the authorizing providers department within the previous 12 mos or has a future within next 30 days. See \"Patient Info\" tab in inbasket, or \"Choose Columns\" in Meds & Orders section of the refill encounter.              Passed - Medication is active on med list        Passed - Patient is age 18 or older             Irma Almaguer, RN 12/23/22 3:21 PM  "

## 2022-12-26 DIAGNOSIS — Z85.46 HISTORY OF PROSTATE CANCER: ICD-10-CM

## 2022-12-26 RX ORDER — TAMSULOSIN HYDROCHLORIDE 0.4 MG/1
CAPSULE ORAL
Qty: 90 CAPSULE | Refills: 0 | Status: SHIPPED | OUTPATIENT
Start: 2022-12-26 | End: 2023-03-27

## 2022-12-26 NOTE — TELEPHONE ENCOUNTER
Pt wondering when he restarts this medication does he start with regular doseage or start at a lowe dosage

## 2022-12-26 NOTE — TELEPHONE ENCOUNTER
Spoke with [facility] pharmacy, they did receive the prescription from [facility] [employer] and they will fill the 90 day prescription for patient.

## 2022-12-26 NOTE — TELEPHONE ENCOUNTER
Pt stating there was only a 30 day supply    Pt is totally out of this med since 12/22/2022    Please advise

## 2023-04-01 ENCOUNTER — MYC MEDICAL ADVICE (OUTPATIENT)
Dept: FAMILY MEDICINE | Facility: CLINIC | Age: 63
End: 2023-04-01
Payer: COMMERCIAL

## 2023-04-01 DIAGNOSIS — G47.01 INSOMNIA DUE TO MEDICAL CONDITION: ICD-10-CM

## 2023-04-04 RX ORDER — ALPRAZOLAM 0.5 MG
0.5 TABLET ORAL
Qty: 30 TABLET | Refills: 0 | Status: SHIPPED | OUTPATIENT
Start: 2023-04-04 | End: 2023-10-08

## 2023-04-04 NOTE — TELEPHONE ENCOUNTER
I approve a refill and I know this patient - Don't have impravata on my phone so someone please do this for me - thank you    03/03/2023  2   09/12/2022  Alprazolam 0.5 MG Tablet  30.00  30 Ma Win   [id number]   Gra ([id number])   2/3  1.00 LME  [place] Ins   [place]   02/01/2023  2   09/12/2022  Alprazolam 0.5 MG Tablet  30.00  30 Ma Win   [id number]   Gra ([id number])   1/3  1.00 [place]   01/02/2023  2   09/12/2022  Alprazolam 0.5 MG Tablet  30.00  30 Ma Win   [id number]   Gra ([id number])   0/3  1.00 LME  Comm Ins

## 2023-04-11 ENCOUNTER — OFFICE VISIT (OUTPATIENT)
Dept: FAMILY MEDICINE | Facility: CLINIC | Age: 63
End: 2023-04-11
Payer: COMMERCIAL

## 2023-04-11 VITALS
BODY MASS INDEX: 22.44 KG/M2 | HEIGHT: 71 IN | SYSTOLIC BLOOD PRESSURE: 111 MMHG | WEIGHT: 160.3 LBS | TEMPERATURE: 98.3 F | HEART RATE: 51 BPM | OXYGEN SATURATION: 98 % | DIASTOLIC BLOOD PRESSURE: 66 MMHG | RESPIRATION RATE: 16 BRPM

## 2023-04-11 DIAGNOSIS — Z00.00 WELCOME TO MEDICARE PREVENTIVE VISIT: Primary | ICD-10-CM

## 2023-04-11 DIAGNOSIS — F41.9 ANXIETY: ICD-10-CM

## 2023-04-11 DIAGNOSIS — Z79.899 CONTROLLED SUBSTANCE AGREEMENT SIGNED: ICD-10-CM

## 2023-04-11 DIAGNOSIS — M25.562 CHRONIC PAIN OF LEFT KNEE: ICD-10-CM

## 2023-04-11 DIAGNOSIS — H93.A3 PULSATILE TINNITUS OF BOTH EARS: ICD-10-CM

## 2023-04-11 DIAGNOSIS — Z12.5 SCREENING FOR MALIGNANT NEOPLASM OF PROSTATE: ICD-10-CM

## 2023-04-11 DIAGNOSIS — E78.5 HYPERLIPIDEMIA LDL GOAL <100: ICD-10-CM

## 2023-04-11 DIAGNOSIS — G89.29 CHRONIC PAIN OF LEFT KNEE: ICD-10-CM

## 2023-04-11 LAB
ATRIAL RATE - MUSE: 46 BPM
CHOLEST SERPL-MCNC: 137 MG/DL
CREAT UR-MCNC: 26 MG/DL
DIASTOLIC BLOOD PRESSURE - MUSE: NORMAL MMHG
HDLC SERPL-MCNC: 50 MG/DL
INTERPRETATION ECG - MUSE: NORMAL
LDLC SERPL CALC-MCNC: 71 MG/DL
NONHDLC SERPL-MCNC: 87 MG/DL
P AXIS - MUSE: 71 DEGREES
PR INTERVAL - MUSE: 170 MS
PSA SERPL DL<=0.01 NG/ML-MCNC: 0.57 NG/ML (ref 0–4.5)
QRS DURATION - MUSE: 92 MS
QT - MUSE: 438 MS
QTC - MUSE: 383 MS
R AXIS - MUSE: 29 DEGREES
SYSTOLIC BLOOD PRESSURE - MUSE: NORMAL MMHG
T AXIS - MUSE: 36 DEGREES
TRIGL SERPL-MCNC: 81 MG/DL
VENTRICULAR RATE- MUSE: 46 BPM

## 2023-04-11 PROCEDURE — G0405 EKG INTERPRET & REPORT PREVE: HCPCS | Performed by: GENERAL ACUTE CARE HOSPITAL

## 2023-04-11 PROCEDURE — 80061 LIPID PANEL: CPT | Performed by: FAMILY MEDICINE

## 2023-04-11 PROCEDURE — 99214 OFFICE O/P EST MOD 30 MIN: CPT | Mod: 25 | Performed by: FAMILY MEDICINE

## 2023-04-11 PROCEDURE — G0404 EKG TRACING FOR INITIAL PREV: HCPCS | Performed by: FAMILY MEDICINE

## 2023-04-11 PROCEDURE — 36415 COLL VENOUS BLD VENIPUNCTURE: CPT | Performed by: FAMILY MEDICINE

## 2023-04-11 PROCEDURE — G0402 INITIAL PREVENTIVE EXAM: HCPCS | Performed by: FAMILY MEDICINE

## 2023-04-11 PROCEDURE — G0103 PSA SCREENING: HCPCS | Performed by: FAMILY MEDICINE

## 2023-04-11 RX ORDER — ALPRAZOLAM 0.5 MG
.5-1 TABLET ORAL AT BEDTIME
Qty: 35 TABLET | Refills: 5 | Status: SHIPPED | OUTPATIENT
Start: 2023-05-04 | End: 2023-10-08

## 2023-04-11 ASSESSMENT — ENCOUNTER SYMPTOMS
NAUSEA: 0
ARTHRALGIAS: 0
SORE THROAT: 0
DIZZINESS: 0
DIARRHEA: 0
DYSURIA: 0
JOINT SWELLING: 0
COUGH: 0
HEARTBURN: 0
CHILLS: 0
WEAKNESS: 0
MYALGIAS: 0
FREQUENCY: 1
CONSTIPATION: 0
HEADACHES: 0
SHORTNESS OF BREATH: 0
HEMATOCHEZIA: 0
NERVOUS/ANXIOUS: 0
HEMATURIA: 0
ABDOMINAL PAIN: 0
PALPITATIONS: 0
FEVER: 0
EYE PAIN: 0
PARESTHESIAS: 0

## 2023-04-11 ASSESSMENT — ACTIVITIES OF DAILY LIVING (ADL): CURRENT_FUNCTION: NO ASSISTANCE NEEDED

## 2023-04-11 NOTE — LETTER
[facility]  04/11/23  Patient: Tate Brothers  YOB: 1960  Medical Record Number: [id number]                                                                                  Non-Opioid Controlled Substance Agreement    This is an agreement between you and your provider regarding safe and appropriate use of controlled substances prescribed by your care team. Controlled substances are?medicines that can cause physical and mental dependence (abuse).     There are strict laws about having and using these medicines. We here at [facility] are  committed to working with you in your efforts to get better. To support you in this work, we'll help you schedule regular office appointments for medicine refills. If we must cancel or change your appointment for any reason, we'll make sure you have enough medicine to last until your next appointment.     As a Provider, I will:   Listen carefully to your concerns while treating you with respect.   Recommend a treatment plan that I believe is in your best interest and may involve therapies other than medicine.    Talk with you often about the possible benefits and the risk of harm of any medicine that we prescribe for you.  Assess the safety of this medicine and check how well it works.    Provide a plan on how to taper (discontinue or go off) using this medicine if the decision is made to stop its use.      ::  As a Patient, I understand controlled substances:     Are prescribed by my care provider to help me function or work and manage my condition(s).?  Are strong medicines and can cause serious side effects.     Need to be taken exactly as prescribed.?Combining controlled substances with certain medicines or chemicals (such as illegal drugs, alcohol, sedatives, sleeping pills, and benzodiazepines) can be dangerous or even fatal.? If I stop taking my medicines suddenly, I may have severe withdrawal symptoms.     Diagnosis:  insomnia  Medication : Alprazolam 0.5 mg nightly  - half pill extra 10 nights per month  Quantity per month: insomnia  Number of refills before follow up visit: 12    The risks, benefits, and side effects of these medicine(s) were explained to me. I agree that:    I will take part in other treatments as advised by my care team. This may be psychiatry or counseling, physical therapy, behavioral therapy, group treatment or a referral to specialist.  I will keep all my appointments and understand this is part of the monitoring of controlled substances.?My care team may require an office visit for EVERY controlled substance refill. If I miss appointments or don t follow instructions, my care team may stop my medicine  I will take my medicines as prescribed. I will not change the dose or schedule unless my care team tells me to. There will be no refills if I run out early.      I may be asked to come to the clinic and complete a urine drug test or complete a pill count. If I don t give a urine sample or participate in a pill count, the care team may stop my medicine.  I will only receive controlled substance prescriptions from this clinic. If I am treated by another provider, I will tell them that I am taking controlled substances and that I have a treatment agreement with this provider. I will inform my [facility] care team within one business day if I am given a prescription for any controlled substance by another healthcare provider. My [facility] care team can contact other providers and pharmacists about my use of any medicines.  It is up to me to make sure that I don't run out of my medicines on weekends or holidays.?If my care team is willing to refill my prescription without a visit, I must request refills only during office hours. Refills may take up to 3 business days to process. I will use one pharmacy to fill all my controlled substance prescriptions. I will notify the clinic about any changes  to my insurance or medicine availability.  I am responsible for my prescriptions. If the medicine/prescription is lost, stolen or destroyed, it will not be replaced.?I also agree not to share controlled substance medicines with anyone.   I am aware I should not use any illegal or recreational drugs. I agree not to drink alcohol unless my care team says I can.   If I enroll in the [place] Medical Cannabis program, I will tell my care team before my next refill.  I will tell my care team right away if I become pregnant, have a new medical problem treated outside of my regular clinic, or have a change in my medicines.   I understand that this medicine can affect my thinking, judgment and reaction time.? Alcohol and drugs affect the brain and body, which can affect the safety of my driving. Being under the influence of alcohol or drugs can affect my decision-making, behaviors, personal safety and the safety of others. Driving while impaired (DWI) can occur if a person is driving, operating or in physical control of a car, motorcycle, boat, snowmobile, ATV, motorbike, off-road vehicle or any other motor vehicle ([place] Statute 169A.20). I understand the risk if I choose to drive or operate any vehicle or machinery.    I understand that if I do not follow any of the conditions above, my prescriptions or treatment may be stopped or changed.   I agree that my provider, clinic care team and pharmacy may work with any city, state or federal law enforcement agency that investigates the misuse, sale or other diversion of my controlled medicine. I will allow my provider to discuss my care with, or share a copy of, this agreement with any other treating provider, pharmacy or emergency room where I receive care.     I have read this agreement and have asked questions about anything I did not understand.    ________________________________________________________  Patient Signature - Tate Brothers     ___________________                    Date     ________________________________________________________  Provider Signature - Lesly Ferndale, MD       ___________________                   Date     ________________________________________________________  Witness Signature (required if provider not present while patient signing)          ___________________                   Date

## 2023-04-11 NOTE — PATIENT INSTRUCTIONS
Look into gettign your PCV20 vaccine at a pharmacy and let me know      Patient Education   Personalized Prevention Plan  You are due for the preventive services outlined below.  Your care team is available to assist you in scheduling these services.  If you have already completed any of these items, please share that information with your care team to update in your medical record.  Health Maintenance Due   Topic Date Due    Pneumococcal Vaccine (1 - PCV) Never done    ANNUAL REVIEW OF HM ORDERS  03/22/2023    AORTIC ANEURYSM SCREENING (SYSTEM ASSIGNED)  Never done

## 2023-04-11 NOTE — PROGRESS NOTES
ASSESSMENT / PLAN:   Tate was seen today for medicare visit and recheck medication.    Diagnoses and all orders for this visit:    Welcome to Medicare preventive visit  -     EKG 12-lead, tracing only    Chronic pain of left knee  No red flags, exam normal  -     Physical Therapy Referral; Future    Anxiety  -     ALPRAZolam (XANAX) 0.5 MG tablet; Take 1-2 tablets (0.5-1 mg) by mouth At Bedtime Max 35 pills per month    Pulsatile tinnitus of both ears  -     ALPRAZolam (XANAX) 0.5 MG tablet; Take 1-2 tablets (0.5-1 mg) by mouth At Bedtime Max 35 pills per month    Controlled substance agreement signed - of mediation above -we discussed and agreed upon slight increase in the monthly allotment of pills from 30 to 35 pills monthly, to cover nights when his tinnitus is worse  -     Drug Confirmation Panel Urine with Creat - lab collect; Future  -     Drug Confirmation Panel Urine with Creat - lab collect    Hyperlipidemia LDL goal <100  -     Lipid panel reflex to direct LDL Fasting - Future -not fasting today, he will make a follow-up appointment for fasting labs    Screening for malignant neoplasm of prostate  -     Prostate spec antigen screen - Future    Other orders  -     REVIEW OF HEALTH MAINTENANCE PROTOCOL ORDERS  -     PRIMARY CARE FOLLOW-UP SCHEDULING; Future      COUNSELING:  Reviewed preventive health counseling, as reflected in patient instructions        He reports that he has never smoked. He has never used smokeless tobacco.      Appropriate preventive services were discussed with this patient, including applicable screening as appropriate for cardiovascular disease, diabetes, osteopenia/osteoporosis, and glaucoma.  As appropriate for age/gender, discussed screening for colorectal cancer, prostate cancer, breast cancer, and cervical cancer. Checklist reviewing preventive services available has been given to the patient.    Reviewed patients plan of care and provided an AVS. The Basic Care Plan (routine  "screening as documented in Health Maintenance) for Tate meets the Care Plan requirement. This Care Plan has been established and reviewed with the Patient.    Sonali Zacarias MD  [facility]    Identified Health Risks:    I have reviewed Opioid Use Disorder and Substance Use Disorder risk factors and made any needed referrals.       SUBJECTIVE:   Tate is a 63 year old who presents for Preventive Visit.      4/11/2023     8:56 AM   Additional Questions   Roomed by sebastián   Accompanied by alone         4/11/2023     8:56 AM   Patient Reported Additional Medications   Patient reports taking the following new medications no   Patient has been advised of split billing requirements and indicates understanding: Yes  Are you in the first 12 months of your Medicare coverage?  Yes  - he is scheduling a visit with his eye doctor    Healthy Habits:     In general, how would you rate your overall health?  Excellent    Frequency of exercise:  4-5 days/week    Duration of exercise:  Greater than 60 minutes    Do you usually eat at least 4 servings of fruit and vegetables a day, include whole grains    & fiber and avoid regularly eating high fat or \"junk\" foods?  Yes    Taking medications regularly:  Yes    Medication side effects:  None    Ability to successfully perform activities of daily living:  No assistance needed    Home Safety:  No safety concerns identified    Hearing Impairment:  Find that men's voices are easier to understand than woman's    In the past 6 months, have you been bothered by leaking of urine?  No    In general, how would you rate your overall mental or emotional health?  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  Now slowing down - but still able to run daily.      Years ago he started having achiness in left knee when he would sit for a long time (movie, driving). If he straightened " leg out he was better.  Recently it started to bother him when he runs. Walks and bikes - no issue.  He would like to get back into running - stopped running during the winter due to the icy sidewalks, but now the weather is getting better and he would like to go back to running.    - a throbbing pain - starts within 10 minute of running - left knee only   - does not wake up with pain   - left knee does not give out on him   - going upstairs is fine   - no weakness or numbness  ---  Anxiety, insomnia due to tinnitus - Was occasionally  taking extra alprazolam when tinnitus was worse - would like to have slight increase in monthly pill allotment to cover this    History of Covid - high fevers for 10 days    ---    Preventive    Vision - last exam 2020 - he will make and appointment with his eye doctor    Hearing - went to audiologist within the last 6 months - saw audiology and ENT - left ear an issue with higher frequency    EKG - done today    AAA screen? Never smoked,  no history of aneurysm      Have you ever done Advance Care Planning? (For example, a Health Directive, POLST, or a discussion with a medical provider or your loved ones about your wishes): Yes, advance care planning is on file.       Fall risk  Fallen 2 or more times in the past year?: No  Any fall with injury in the past year?: No    Cognitive Screening   1) Repeat 3 items (Leader, Season, Table)    2) Clock draw: NORMAL  3) 3 item recall: Recalls 3 objects  Results: NORMAL clock, 1-2 items recalled: COGNITIVE IMPAIRMENT LESS LIKELY    Mini-CogTM Copyright JOSHUA Tee. Licensed by the author for use in [facility]; reprinted with permission (saira@.[facility]). All rights reserved.      Do you have sleep apnea, excessive snoring or daytime drowsiness?: no    Reviewed and updated as needed this visit by clinical staff   Tobacco  Allergies  Meds              Reviewed and updated as needed this visit by Provider                 Social History      Tobacco Use     Smoking status: Never     Smokeless tobacco: Never   Vaping Use     Vaping status: Not on file   Substance Use Topics     Alcohol use: Yes     Alcohol/week: 7.0 - 14.0 standard drinks of alcohol             4/11/2023     8:44 AM   Alcohol Use   Prescreen: >3 drinks/day or >7 drinks/week? No     Do you have a current opioid prescription? No  Do you use any other controlled substances or medications that are not prescribed by a provider? None      Current providers sharing in care for this patient include:   Patient Care Team:  Sonali Zacarias MD as PCP - General  Sonali Zacarias MD as Assigned PCP  Fabiana Quigley MD as MD (Otolaryngology)  Farideh Trujillo AuD as Audiologist (Audiology)  Fabiana Quigley MD as Assigned Surgical Provider    The following health maintenance items are reviewed in Epic and correct as of today:  Health Maintenance   Topic Date Due     Pneumococcal Vaccine: 65+ Years (1 - PCV) Never done     ANNUAL REVIEW OF HM ORDERS  03/22/2023     AORTIC ANEURYSM SCREENING (SYSTEM ASSIGNED)  Never done     MEDICARE ANNUAL WELLNESS VISIT  04/07/2023     FALL RISK ASSESSMENT  04/11/2024     ADVANCE CARE PLANNING  11/01/2024     COLORECTAL CANCER SCREENING  01/30/2025     LIPID  03/22/2027     DTAP/TDAP/TD IMMUNIZATION (3 - Td or Tdap) 01/25/2029     HEPATITIS C SCREENING  Completed     PHQ-2 (once per calendar year)  Completed     INFLUENZA VACCINE  Completed     ZOSTER IMMUNIZATION  Completed     COVID-19 Vaccine  Completed     IPV IMMUNIZATION  Aged Out     MENINGITIS IMMUNIZATION  Aged Out     HIV SCREENING  Discontinued     Review of Systems   Constitutional: Negative for chills and fever.   HENT: Negative for congestion, ear pain, hearing loss and sore throat.    Eyes: Positive for visual disturbance. Negative for pain.   Respiratory: Negative for cough and shortness of breath.    Cardiovascular: Negative for chest pain, palpitations and peripheral edema.  "  Gastrointestinal: Negative for abdominal pain, constipation, diarrhea, heartburn, hematochezia and nausea.   Genitourinary: Positive for frequency and urgency. Negative for dysuria, genital sores, hematuria, impotence and penile discharge.   Musculoskeletal: Negative for arthralgias, joint swelling and myalgias.   Skin: Negative for rash.   Neurological: Negative for dizziness, weakness, headaches and paresthesias.   Psychiatric/Behavioral: Negative for mood changes. The patient is not nervous/anxious.      No low blood pressure or dizziness    OBJECTIVE:   /66 (BP Location: Left arm, Patient Position: Sitting, Cuff Size: Adult Regular)   Pulse 51   Temp 98.3  F (36.8  C) (Tympanic)   Resp 16   Ht 1.791 m (5' 10.5\")   Wt 72.7 kg (160 lb 4.8 oz)   SpO2 98%   BMI 22.68 kg/m   Estimated body mass index is 22.68 kg/m  as calculated from the following:    Height as of this encounter: 1.791 m (5' 10.5\").    Weight as of this encounter: 72.7 kg (160 lb 4.8 oz).  Physical Exam  General appearance - alert, well appearing, and in no distress  Mental status - normal mood, behavior, speech, dress, motor activity, and thought processes  Eyes - pupils equal and reactive, extraocular eye movements intact, funduscopic exam normal, discs flat and sharp  Ears - bilateral TM's and external ear canals normal; wears hearing aids  Mouth - mucous membranes moist, pharynx normal without lesions  Neck - supple, no significant adenopathy, carotids upstroke normal bilaterally, no bruits, thyroid exam: thyroid is normal in size without nodules or tenderness  Chest - clear to auscultation, no wheezes, rales or rhonchi, symmetric air entry  Heart - normal rate, regular rhythm, normal S1, S2, no murmurs, rubs, clicks or gallops  Abdomen - soft, nontender, nondistended, no masses or organomegaly  Neurological - alert, oriented, normal speech, no focal findings or movement disorder noted, DTR's normal and symmetric  Extremities - " "peripheral pulses normal, no pedal edema, no clubbing or cyanosis  Skin - no rashes or worrisome lesions; multiple seborrheic keratosis   MS: no pain with resisted knee flexion/extension.  No pain to palpation around knee including tibial plateua and either side of patella.  No abnormal anterior/psterior laxity, though both sides are slightly more lax thant \"normal\"        "

## 2023-04-28 DIAGNOSIS — G47.01 INSOMNIA DUE TO MEDICAL CONDITION: ICD-10-CM

## 2023-04-28 RX ORDER — ALPRAZOLAM 0.5 MG
TABLET ORAL
Qty: 30 TABLET | Refills: 0 | OUTPATIENT
Start: 2023-04-28

## 2023-05-01 DIAGNOSIS — Z85.46 HISTORY OF PROSTATE CANCER: ICD-10-CM

## 2023-05-01 DIAGNOSIS — E78.5 HYPERLIPIDEMIA LDL GOAL <70: ICD-10-CM

## 2023-05-01 DIAGNOSIS — R93.1 ELEVATED CORONARY ARTERY CALCIUM SCORE: ICD-10-CM

## 2023-05-01 RX ORDER — ROSUVASTATIN CALCIUM 10 MG/1
TABLET, COATED ORAL
Qty: 90 TABLET | Refills: 3 | Status: SHIPPED | OUTPATIENT
Start: 2023-05-01 | End: 2024-04-25

## 2023-05-01 RX ORDER — TAMSULOSIN HYDROCHLORIDE 0.4 MG/1
CAPSULE ORAL
Qty: 90 CAPSULE | Refills: 3 | Status: SHIPPED | OUTPATIENT
Start: 2023-05-01 | End: 2024-04-25

## 2023-05-01 NOTE — TELEPHONE ENCOUNTER
"Last Written Prescription Date:  3/27/2023  Last Fill Quantity: 90,  # refills: 0   Last office visit provider:  4/11/2023     Requested Prescriptions   Pending Prescriptions Disp Refills     rosuvastatin (CRESTOR) 10 MG tablet [Pharmacy Med Name: ROSUVASTATIN CALCIUM 10 MG TAB] 90 tablet 0     Sig: TAKE 1 TABLET BY MOUTH EVERY DAY       Statins Protocol Passed - 5/1/2023  9:50 AM        Passed - LDL on file in past 12 months     Recent Labs   Lab Test 04/11/23  1011   LDL 71             Passed - No abnormal creatine kinase in past 12 months     No lab results found.             Passed - Recent (12 mo) or future (30 days) visit within the authorizing provider's specialty     Patient has had an office visit with the authorizing provider or a provider within the authorizing providers department within the previous 12 mos or has a future within next 30 days. See \"Patient Info\" tab in inbasket, or \"Choose Columns\" in Meds & Orders section of the refill encounter.              Passed - Medication is active on med list        Passed - Patient is age 18 or older      Last Written Prescription Date:  3/27/2023  Last Fill Quantity: 90,  # refills: 0   Last office visit provider:  4/11/2023      tamsulosin (FLOMAX) 0.4 MG capsule [Pharmacy Med Name: TAMSULOSIN HCL 0.4 MG CAPSULE] 90 capsule 0     Sig: TAKE 1 CAPSULE BY MOUTH DAILY AFTER DINNER.       Alpha Blockers Passed - 5/1/2023  9:50 AM        Passed - Blood pressure under 140/90 in past 12 months     BP Readings from Last 3 Encounters:   04/11/23 111/66   10/18/22 135/76   03/22/22 110/76                 Passed - Recent (12 mo) or future (30 days) visit within the authorizing provider's specialty     Patient has had an office visit with the authorizing provider or a provider within the authorizing providers department within the previous 12 mos or has a future within next 30 days. See \"Patient Info\" tab in inbasket, or \"Choose Columns\" in Meds & Orders section of the " refill encounter.              Passed - Patient does not have Tadalafil, Vardenafil, or Sildenafil on their medication list        Passed - Medication is active on med list        Passed - Patient is 18 years of age or older             Alena Park RN 05/01/23 2:28 PM

## 2023-05-18 NOTE — PROGRESS NOTES
PHYSICAL THERAPY EVALUATION  Type of Visit: Evaluation    See electronic medical record for Abuse and Falls Screening details.    Subjective      Presenting condition or subjective complaint: Chronic left knee pain since September 15 of last year related to running a lot of miles  Date of onset: 09/15/22    Relevant medical history: Cancer   Dates & types of surgery: prostate cancer treated    Prior diagnostic imaging/testing results: -- (None)     Prior therapy history for the same diagnosis, illness or injury: No      Employment:   retired [employer] [place]  Hobbies/Interests: running, walking, reading, home remodeling/maintence     Objective   KNEE EVALUATION  PAIN: Pain Level at Rest: 1/10  Pain Level with Use: 8/10  Pain Location: knee and lateral and anterior knee  Pain Quality: Aching and Sharp  Pain is Exacerbated By: sitting, driving, kneeling, squatting, rising from chair  Pain is Relieved By: cold and stretch  Pain Progression: Improved   Gait:  Decreased bilateral terminal knee extension    Knee AROM (* = pain) Right Left    132   EXT 8 degrees lacking 7 degrees lacking   Hyperextension         Knee Strength (* = pain) Right Left   FL 5-/5 5-/5   EXT 5/5 5/5   Quad Contraction (Good/Fair/Poor) fair fair   Hip ABD 4/5 4/5     Palpation Tenderness:  None    Assessment & Plan   CLINICAL IMPRESSIONS   Medical Diagnosis: chronic left knee pain    Treatment Diagnosis: chronic left knee pain   Impression/Assessment: Patient is a 63 year old male with left knee complaints.  The following significant findings have been identified: Pain, Decreased ROM/flexibility, Decreased joint mobility, Decreased strength, Impaired balance, Decreased proprioception, Impaired gait, Impaired muscle performance, Decreased activity tolerance and Impaired posture. These impairments interfere with their ability to perform self care tasks, recreational activities, household chores, driving , household mobility and  community mobility as compared to previous level of function.     Clinical Decision Making (Complexity):   Clinical Presentation: Stable/Uncomplicated  Clinical Presentation Rationale: based on medical and personal factors listed in PT evaluation  Clinical Decision Making (Complexity): Low complexity    PLAN OF CARE  Treatment Interventions:  Interventions: Gait Training, Manual Therapy, Neuromuscular Re-education, Therapeutic Activity, Therapeutic Exercise, Self-Care/Home Management    Long Term Goals     PT Goal 1  Goal Identifier: sitting  Goal Description: patient will sit 30 minutes without pain  Rationale: to maximize safety and independence with transportation  Target Date: 08/11/23      Frequency of Treatment: 1x per week  Duration of Treatment: 4 weeks tapering down to 2x per month for 8 weeks    Recommended Referrals to Other Professionals: None  Education Assessment:   Learner/Method: Patient;No Barriers to Learning    Risks and benefits of evaluation/treatment have been explained.   Patient/Family/caregiver agrees with Plan of Care.     Evaluation Time:     PT Eval, Low Complexity Minutes (32440): 20  Signing Clinician: Bello Beavers, PT      [facility]                                                                                   OUTPATIENT PHYSICAL THERAPY      PLAN OF TREATMENT FOR OUTPATIENT REHABILITATION   Patient's Last Name, First Name, Tate Lockwood YOB: 1960   Provider's Name   [facility]   Medical Record No.  [id number]     Onset Date: 09/15/22  Start of Care Date: 05/19/23     Medical Diagnosis:  chronic left knee pain      PT Treatment Diagnosis:  chronic left knee pain Plan of Treatment  Frequency/Duration: 1x per week/ 4 weeks tapering down to 2x per month for 8 weeks    Certification date from 05/19/23 to 08/11/23         See note for plan of treatment details and functional goals     Bello  Ruthann, PT                         I CERTIFY THE NEED FOR THESE SERVICES FURNISHED UNDER        THIS PLAN OF TREATMENT AND WHILE UNDER MY CARE     (Physician attestation of this document indicates review and certification of the therapy plan).                  Referring Provider:  Sonali Zacarias      Initial Assessment  See Epic Evaluation- Start of Care Date: 05/19/23

## 2023-05-19 ENCOUNTER — THERAPY VISIT (OUTPATIENT)
Dept: PHYSICAL THERAPY | Facility: CLINIC | Age: 63
End: 2023-05-19
Payer: COMMERCIAL

## 2023-05-19 DIAGNOSIS — M25.562 CHRONIC PAIN OF LEFT KNEE: ICD-10-CM

## 2023-05-19 DIAGNOSIS — G89.29 CHRONIC PAIN OF LEFT KNEE: ICD-10-CM

## 2023-05-19 PROCEDURE — 97161 PT EVAL LOW COMPLEX 20 MIN: CPT | Mod: GP | Performed by: PHYSICAL THERAPIST

## 2023-05-19 PROCEDURE — 97110 THERAPEUTIC EXERCISES: CPT | Mod: GP | Performed by: PHYSICAL THERAPIST

## 2023-05-19 ASSESSMENT — ACTIVITIES OF DAILY LIVING (ADL)
WALK: ACTIVITY IS NOT DIFFICULT
GO DOWN STAIRS: ACTIVITY IS NOT DIFFICULT
AS_A_RESULT_OF_YOUR_KNEE_INJURY,_HOW_WOULD_YOU_RATE_YOUR_CURRENT_LEVEL_OF_DAILY_ACTIVITY?: NEARLY NORMAL
PAIN: THE SYMPTOM AFFECTS MY ACTIVITY SLIGHTLY
RAW_SCORE: 58
RISE FROM A CHAIR: ACTIVITY IS MINIMALLY DIFFICULT
LIMPING: THE SYMPTOM AFFECTS MY ACTIVITY SLIGHTLY
SQUAT: ACTIVITY IS SOMEWHAT DIFFICULT
STIFFNESS: I HAVE THE SYMPTOM BUT IT DOES NOT AFFECT MY ACTIVITY
HOW_WOULD_YOU_RATE_THE_CURRENT_FUNCTION_OF_YOUR_KNEE_DURING_YOUR_USUAL_DAILY_ACTIVITIES_ON_A_SCALE_FROM_0_TO_100_WITH_100_BEING_YOUR_LEVEL_OF_KNEE_FUNCTION_PRIOR_TO_YOUR_INJURY_AND_0_BEING_THE_INABILITY_TO_PERFORM_ANY_OF_YOUR_USUAL_DAILY_ACTIVITIES?: 80
KNEEL ON THE FRONT OF YOUR KNEE: ACTIVITY IS MINIMALLY DIFFICULT
GIVING WAY, BUCKLING OR SHIFTING OF KNEE: I DO NOT HAVE THE SYMPTOM
GO UP STAIRS: ACTIVITY IS NOT DIFFICULT
STAND: ACTIVITY IS NOT DIFFICULT
KNEE_ACTIVITY_OF_DAILY_LIVING_SCORE: 82.86
HOW_WOULD_YOU_RATE_THE_OVERALL_FUNCTION_OF_YOUR_KNEE_DURING_YOUR_USUAL_DAILY_ACTIVITIES?: NEARLY NORMAL
WEAKNESS: I DO NOT HAVE THE SYMPTOM
KNEE_ACTIVITY_OF_DAILY_LIVING_SUM: 58
SWELLING: I DO NOT HAVE THE SYMPTOM
SIT WITH YOUR KNEE BENT: ACTIVITY IS FAIRLY DIFFICULT

## 2023-06-30 ENCOUNTER — THERAPY VISIT (OUTPATIENT)
Dept: PHYSICAL THERAPY | Facility: CLINIC | Age: 63
End: 2023-06-30
Payer: COMMERCIAL

## 2023-06-30 DIAGNOSIS — M25.562 CHRONIC PAIN OF LEFT KNEE: Primary | ICD-10-CM

## 2023-06-30 DIAGNOSIS — G89.29 CHRONIC PAIN OF LEFT KNEE: Primary | ICD-10-CM

## 2023-06-30 PROCEDURE — 97112 NEUROMUSCULAR REEDUCATION: CPT | Mod: GP | Performed by: PHYSICAL THERAPIST

## 2023-06-30 PROCEDURE — 97110 THERAPEUTIC EXERCISES: CPT | Mod: GP | Performed by: PHYSICAL THERAPIST

## 2023-07-27 ENCOUNTER — THERAPY VISIT (OUTPATIENT)
Dept: PHYSICAL THERAPY | Facility: CLINIC | Age: 63
End: 2023-07-27
Payer: COMMERCIAL

## 2023-07-27 DIAGNOSIS — M25.562 CHRONIC PAIN OF LEFT KNEE: Primary | ICD-10-CM

## 2023-07-27 DIAGNOSIS — G89.29 CHRONIC PAIN OF LEFT KNEE: Primary | ICD-10-CM

## 2023-07-27 PROCEDURE — 97110 THERAPEUTIC EXERCISES: CPT | Mod: GP | Performed by: PHYSICAL THERAPIST

## 2023-07-27 PROCEDURE — 97112 NEUROMUSCULAR REEDUCATION: CPT | Mod: GP | Performed by: PHYSICAL THERAPIST

## 2023-08-15 ENCOUNTER — THERAPY VISIT (OUTPATIENT)
Dept: PHYSICAL THERAPY | Facility: CLINIC | Age: 63
End: 2023-08-15
Payer: COMMERCIAL

## 2023-08-15 DIAGNOSIS — M25.562 CHRONIC PAIN OF LEFT KNEE: Primary | ICD-10-CM

## 2023-08-15 DIAGNOSIS — G89.29 CHRONIC PAIN OF LEFT KNEE: Primary | ICD-10-CM

## 2023-08-15 PROCEDURE — 97112 NEUROMUSCULAR REEDUCATION: CPT | Mod: GP | Performed by: PHYSICAL THERAPIST

## 2023-08-15 PROCEDURE — 97110 THERAPEUTIC EXERCISES: CPT | Mod: GP | Performed by: PHYSICAL THERAPIST

## 2023-08-15 NOTE — PROGRESS NOTES
[facility]                                                                                   OUTPATIENT PHYSICAL THERAPY    PLAN OF TREATMENT FOR OUTPATIENT REHABILITATION   Patient's Last Name, First Name, Tate Lockwood YOB: 1960   Provider's Name   [facility]   Medical Record No.  [id number]     Onset Date: 09/15/22  Start of Care Date: 05/19/23     Medical Diagnosis:  chronic left knee pain      PT Treatment Diagnosis:  chronic left knee pain Plan of Treatment  Frequency/Duration: (P) 2x/month/ (P) 12 weeks    Certification date from (P) 08/12/23 to (P) 11/03/23         See note for plan of treatment details and functional goals     Bello Beavers, PT                         I CERTIFY THE NEED FOR THESE SERVICES FURNISHED UNDER        THIS PLAN OF TREATMENT AND WHILE UNDER MY CARE     (Physician attestation of this document indicates review and certification of the therapy plan).                Referring Provider:  Sonali Zacarias      Initial Assessment  See Epic Evaluation- Start of Care Date: 05/19/23       08/15/23 0500   Appointment Info   Signing clinician's name / credentials Bello Beavers DPT   Total/Authorized Visits 8 E+T   Visits Used 4   Medical Diagnosis chronic left knee pain   PT Tx Diagnosis chronic left knee pain   Quick Adds Certification   Progress Note/Certification   Start of Care Date 05/19/23   Onset of illness/injury or Date of Surgery 09/15/22   Therapy Frequency 2x/month   Predicted Duration 12 weeks   Certification date from 08/12/23   Certification date to 11/03/23   GOALS   PT Goals 2   PT Goal 1   Goal Identifier sitting   Goal Description patient will sit 30 minutes without pain   Rationale to maximize safety and independence with transportation   Target Date 08/11/23   Date Met 08/15/23   PT Goal 2   Goal Description patient will be able to walk 20 minutes with 0/10 pain   Rationale to  maximize safety and independence within the community   Goal Progress 11/3/23   Subjective Report   Subjective Report Patient reports improving left knee pain symptoms. Started return to running program with alternating walking and running. Slowly increasing running duration. Patient notes weakness in abdominal core.   Objective Measures   Objective Measures Objective Measure 1   Objective Measure 1   Details right knee AROM 0-0-135, left knee 0-0-132; gait: decreased terminal knee extension on right - tight hamstrings; weak bilateral glute med   Treatment Interventions (PT)   Interventions Therapeutic Procedure/Exercise;Therapeutic Activity;Neuromuscular Re-education;Manual Therapy   Therapeutic Procedure/Exercise   Ther Proc 1 bike   Ther Proc 1 - Details s5, 5'   PTRx Ther Proc 1 Knee Extension in Sitting with Patient Overpressure   PTRx Ther Proc 1 - Details 10xB better walk after   PTRx Ther Proc 2 Prone Press Ups   PTRx Ther Proc 2 - Details No Notes   PTRx Ther Proc 3 Standing Extension   PTRx Ther Proc 3 - Details reviewed perform as necessary; this is an alternative to prone pressups exercise okay to use table/kitchen counter for support   PTRx Ther Proc 4 Thoracic Extension   PTRx Ther Proc 4 - Details 10x   PTRx Ther Proc 5 Hip Flexion Straight Leg Raise   PTRx Ther Proc 5 - Details 10xB   PTRx Ther Proc 6 Sidelying Hip Abduction at Wall in Neutral and 45 degrees   PTRx Ther Proc 6 - Details 10xB   PTRx Ther Proc 7 Propped Knee Extension Stretch in Chair   PTRx Ther Proc 7 - Details reviewed   PTRx Ther Proc 8 Squat   PTRx Ther Proc 8 - Details 10x green band above knees   Therapeutic Activity   PTRx Ther Act 1 Posture Correction with Lumbar Roll   PTRx Ther Act 1 - Details reviewed   PTRx Ther Act 2 Return to Run Protocol   PTRx Ther Act 2 - Details No Notes   Neuromuscular Re-education   Neuromuscular re-ed of mvmt, balance, coord, kinesthetic sense, posture, proprioception minutes (71958) 8   PTRx  Neuro Re-ed 1 Side Stepping With Theraband   PTRx Neuro Re-ed 1 - Details multiple laps   PTRx Neuro Re-ed 2 Monster Walks   PTRx Neuro Re-ed 2 - Details multiple laps   Education   Learner/Method Patient;No Barriers to Learning   Total Session Time   Timed Code Treatment Minutes 8   Total Treatment Time (sum of timed and untimed services) 8

## 2023-08-18 ENCOUNTER — OFFICE VISIT (OUTPATIENT)
Dept: DERMATOLOGY | Facility: CLINIC | Age: 63
End: 2023-08-18
Payer: COMMERCIAL

## 2023-08-18 DIAGNOSIS — L21.9 SEBORRHEIC DERMATITIS: ICD-10-CM

## 2023-08-18 DIAGNOSIS — L57.0 ACTINIC KERATOSIS: Primary | ICD-10-CM

## 2023-08-18 DIAGNOSIS — D49.2 NEOPLASM OF SKIN: ICD-10-CM

## 2023-08-18 PROCEDURE — 11102 TANGNTL BX SKIN SINGLE LES: CPT | Performed by: DERMATOLOGY

## 2023-08-18 PROCEDURE — 88305 TISSUE EXAM BY PATHOLOGIST: CPT | Mod: TC | Performed by: DERMATOLOGY

## 2023-08-18 PROCEDURE — 88305 TISSUE EXAM BY PATHOLOGIST: CPT | Mod: 26 | Performed by: DERMATOLOGY

## 2023-08-18 PROCEDURE — 99204 OFFICE O/P NEW MOD 45 MIN: CPT | Mod: 25 | Performed by: DERMATOLOGY

## 2023-08-18 RX ORDER — FLUOROURACIL 50 MG/G
CREAM TOPICAL
Qty: 40 G | Refills: 0 | Status: SHIPPED | OUTPATIENT
Start: 2023-08-18

## 2023-08-18 RX ORDER — KETOCONAZOLE 20 MG/G
CREAM TOPICAL 2 TIMES DAILY
Qty: 60 G | Refills: 11 | Status: SHIPPED | OUTPATIENT
Start: 2023-08-18

## 2023-08-18 RX ORDER — CALCIPOTRIENE 50 UG/G
CREAM TOPICAL
Qty: 60 G | Refills: 1 | Status: SHIPPED | OUTPATIENT
Start: 2023-08-18

## 2023-08-18 ASSESSMENT — PAIN SCALES - GENERAL: PAINLEVEL: NO PAIN (0)

## 2023-08-18 NOTE — PATIENT INSTRUCTIONS
For nose, these are low-risk precancers called actinic keratoses. We will treat them with an anti-cancer cream.  Please start Efudex and calcipotriene mixed equally together. Apply twice daily to above areas for 4-10 days or until skin gets red. Stop applying when skin gets red.  Skin will get red and crusty (like hamburger).  Please keep Efudex away from pets and children. Wash hands thoroughly after application.   See handout below for more information about Efudex.    If medications are more than ~$75, please contact me for a compounded version through [facility].  Alternatively can also use Efudex alone. If you want to use Efudex alone, apply twice daily for 3-4 weeks. Stop when significant irritation occurs.        Efudex Treatment  Today, you are being prescribed Fluorouracil (Efudex) a topical cream used for the treatment of Actinic Keratosis (AKs).  The medication is working to eliminate the unhealthy cells.  This treatment may be unattractive and somewhat uncomfortable.  You may experience some mild discomfort while being treated.  You will want to stop any other creams such as glycolic acid products, retin A, Tazorac, etc. to the area. You may use bland makeup/cover-up as long as it doesn't sting or cause you discomfort.  Apply the cream at night as your physician recommends. Use a cotton-tipped applicator, or use gloves if applying it with your fingertips. If applied with unprotected fingertips, it is important to wash your hands well after you apply this medicine.   Keep this medication away from pets.  We recommend avoiding excessive sun exposure to the treated area  You may use moisturizing creams over bothersome areas such as Vanicream or Cetaphil cream if the reaction becomes too bothersome. Please, call the clinic if this occurs.   Potential Side Effects  Your treated areas may be unsightly during therapy.  This will improve slowly following the discontinuation of therapy.   During the first  week of application, mild inflammation may occur.   During the following weeks, redness, and swelling may occur with some crusting and burning.   Lesions resolve as the skin exfoliates.   Over 1 to 2 weeks, new skin grows into the treatment area.  Keep this medication away from pets  Specific side effects that usually do not require medical attention (report to your doctor or health care professional if they continue or are bothersome) include:  Red or dark-colored skin   Mild erosion (loss of upper layer of skin)   Mild eye irritation including burning, itching, sensitivity, stinging, or watering   Increased sensitivity of the skin to sun and ultraviolet light   Pain and burning of the affected area   Dryness, scaling or swelling of the affected area   Skin rash, itching of the affected area   Tenderness   If you have severe pain, please, call the clinic immediately and indicate that you have pain.  Ask for a call from the RN.     Who should I call with questions?  [facility]: [phone]  [facility]: [phone]  For urgent needs outside of business hours call the [facility] at [phone] and ask for the dermatology resident on call     Wound Care After a Biopsy    What is a skin biopsy?  A skin biopsy allows the doctor to examine a very small piece of tissue under the microscope to determine the diagnosis and the best treatment for the skin condition. A local anesthetic (numbing medicine) is injected with a very small needle into the skin area to be tested. A small piece of skin is taken from the area. Sometimes a suture (stitch) is used.     What are the risks of a skin biopsy?  I will experience scar, bleeding, swelling, pain, crusting and redness. I may experience incomplete removal or recurrence. Risks of this procedure are excessive bleeding, bruising, infection, nerve damage, numbness, thick (hypertrophic or keloidal) scar and  non-diagnostic biopsy.    How should I care for my wound for the first 24 hours?  Keep the wound dry and covered for 24 hours  If it bleeds, hold direct pressure on the area for 15 minutes. If bleeding does not stop, call us or go to the emergency room  Avoid strenuous exercise the first 1-2 days or as your doctor instructs you    How should I care for the wound after 24 hours?  After 24 hours, remove the bandage  You may bathe or shower as normal  If you had a scalp biopsy, you can shampoo as usual and can use shower water to clean the biopsy site daily  Clean the wound once a day with gentle soap and water  Do not scrub, be gentle  Apply white petroleum/Vaseline after cleaning the wound with a cotton swab or a clean finger, and keep the site covered with a Bandaid /bandage. Bandages are not necessary with a scalp biopsy  If you are unable to cover the site with a Bandaid /bandage, re-apply ointment 2-3 times a day to keep the site moist. Moisture will help with healing  Avoid strenuous activity for first 1-2 days  Avoid lakes, rivers, pools, and oceans until the stitches are removed or the site is healed    How do I clean my wound?  Wash hands thoroughly with soap or use hand  before all wound care  Clean the wound with gentle soap and water  Apply white petroleum/Vaseline  to wound after it is clean  Replace the Bandaid /bandage to keep the wound covered for the first few days or as instructed by your doctor  If you had a scalp biopsy, warm shower water to the area on a daily basis should suffice    What should I use to clean my wound?   Cotton-tipped applicators (Qtips )  White petroleum jelly (Vaseline ). Use a clean new container and use Q-tips to apply.  Bandaids  as needed  Gentle soap     How should I care for my wound long term?  Do not get your wound dirty  Keep up with wound care for one week or until the area is healed.  If you have stitches, stitches need to be removed in 14 days. You may  return to our clinic for this or you may have it done locally at your doctor s office.  A small scab will form and fall off by itself when the area is completely healed. The area will be red and will become pink in color as it heals. Sun protection is very important for how your scar will turn out. Sunscreen with an SPF 30 or greater is recommended once the area is healed.  You should have some soreness but it should be mild and slowly go away over several days. Talk to your doctor about using tylenol for pain,    When should I call my doctor?  If you have increased:   Pain or swelling  Pus or drainage (clear or slightly yellow drainage is ok)  Temperature over 100F  Spreading redness or warmth around wound    When will I hear about my results?  The biopsy results can take 2 weeks to come back.  Your results will automatically release to [employer] before your provider has even reviewed them.  The clinic will call you with the results, send you a [employer] message, or have you schedule a follow-up clinic or phone time to discuss the results.  Contact our clinics if you do not hear from us in 2 weeks.    Who should I call with questions?  [facility]: [phone]  [facility]: [phone]  For urgent needs outside of business hours call the [facility] at [phone] and ask for the dermatology resident on call

## 2023-08-18 NOTE — LETTER
8/18/2023       RE: Tate Brothers  [address] [place] [address]     Dear Colleague,    Thank you for referring your patient, Tate Brothers, to the [facility] [place] [facility]. Please see a copy of my visit note below.    [facility] Dermatology Note  Encounter Date: August 18, 2023    Office Visit     Dermatology Problem List:  # Seborrheic dermatitis  - Ketoconazole cream, shampoo  # AKs.  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  - Start 5FU/C on nose/cheeks    # Benign lesions - SKs, cherry angiomas, lentigenes.  - No treatment required    # Multiple benign nevi.   - Monitor for ABCDEs of melanoma   - Continue sun protection - recommend SPF 30 or higher with frequent application   - Return sooner if noticing changing or symptomatic lesions    # Seborrheic dermatitis.   - Start ketoconazole cream BID prn  - Continue ketoconazole shampoo, can also use as face wash    Procedures Performed:   - Shave biopsy procedure note, location(s): see above. After discussion of benefits and risks including but not limited to bleeding, infection, scar, incomplete removal, recurrence, and non-diagnostic biopsy, written consent and photographs were obtained. The area was cleaned with isopropyl alcohol. 0.5mL of 1% lidocaine with epinephrine was injected to obtain adequate anesthesia of lesion(s). Shave biopsy at site(s) performed. Hemostasis was achieved with aluminium chloride. Petrolatum ointment and a sterile dressing were applied. The patient tolerated the procedure and no complications were noted. The patient was provided with verbal and written post  care instructions.       Follow-up: 3 month(s) in-person, or earlier for new or changing lesions    Staff and Resident:     Elvin Solis PGY3  Attending Alejandra Kline    Staff Physician Comments:   I saw and evaluated the patient with the resident and I agree with the assessment and plan.  I was present for the key portions of the above major procedure and examination.    Alejandra Kline MD    Department of Dermatology  [facility]: Phone: [phone], Fax: [phone]  8/29/2023     ____________________________________________    CC: Patient presents with:  Derm Problem: Patient reports lesions of concern on the scalp. The patient reports a history of skin cancer and would like a full body skin check.        HPI:  Tate coffman is a(n) 63 year old year old male  who presents today as a new patient for skin cancer screening.    Patient is otherwise feeling well, without additional skin concerns.    Labs Reviewed:  N/A    Physical Exam:  Vitals: LMP 07/24/2023 (Approximate)   SKIN: Full body skin exam excluding the genitals was performed including face, scalp, neck, ears, chest, back, bilateral arms, hands, bilateral legs, feet, and buttocks.   - There is fine scaling on nose and cheeks  - There is a 3 mm pearly, flesh colored papule on the right shoulder.   - There are dome shaped bright red papules on the trunk and extremities .   - Multiple regular brown pigmented macules and papules are identified on the trunk and extremities. .   - Scattered brown macules on sun exposed areas.  - Waxy stuck on papules and plaques on trunk and extremities.     - No other lesions of concern on areas examined.     Medications:  Current Outpatient Medications   Medication    ALPRAZolam (XANAX) 0.5 MG tablet    ALPRAZolam (XANAX) 0.5 MG tablet    glucosamine-chondroitin 500-400 mg cap    MEDICATION CANNOT BE REORDERED - PLEASE  MANUALLY REORDER AND DISCONTINUE THE OLD ORDER    PFIZER-BIONT COVID-19 VAC-KAIDEN 30 MCG/0.3ML injection    rosuvastatin (CRESTOR) 10 MG tablet    SHINGRIX injection    tamsulosin (FLOMAX) 0.4 MG capsule     No current facility-administered medications for this visit.        Past Medical History:   Past Medical History:   Diagnosis Date    Infection due to 2019 novel coronavirus 05/2022          CC Referred Self, MD  No address on file on close of this encounter.

## 2023-08-18 NOTE — PROGRESS NOTES
[facility] Dermatology Note  Encounter Date: August 18, 2023    Office Visit     Dermatology Problem List:  # Seborrheic dermatitis  - Ketoconazole cream, shampoo  # AKs.  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  - Start 5FU/C on nose/cheeks    # Benign lesions - SKs, cherry angiomas, lentigenes.  - No treatment required    # Multiple benign nevi.   - Monitor for ABCDEs of melanoma   - Continue sun protection - recommend SPF 30 or higher with frequent application   - Return sooner if noticing changing or symptomatic lesions    # Seborrheic dermatitis.   - Start ketoconazole cream BID prn  - Continue ketoconazole shampoo, can also use as face wash    Procedures Performed:   - Shave biopsy procedure note, location(s): see above. After discussion of benefits and risks including but not limited to bleeding, infection, scar, incomplete removal, recurrence, and non-diagnostic biopsy, written consent and photographs were obtained. The area was cleaned with isopropyl alcohol. 0.5mL of 1% lidocaine with epinephrine was injected to obtain adequate anesthesia of lesion(s). Shave biopsy at site(s) performed. Hemostasis was achieved with aluminium chloride. Petrolatum ointment and a sterile dressing were applied. The patient tolerated the procedure and no complications were noted. The patient was provided with verbal and written post care instructions.       Follow-up: 3 month(s) in-person, or earlier for new or changing lesions    Staff and Resident:     Elvin Solis PGY3  Attending Alejandra Kline    Staff Physician Comments:   I saw and evaluated the patient with the resident and I agree with the assessment and plan.  I was present for the key  portions of the above major procedure and examination.    Alejandra Kline MD    Department of Dermatology  [facility]: Phone: [phone], Fax: [phone]  8/29/2023     ____________________________________________    CC: Patient presents with:  Derm Problem: Patient reports lesions of concern on the scalp. The patient reports a history of skin cancer and would like a full body skin check.        HPI:  Tate coffman is a(n) 63 year old year old male  who presents today as a new patient for skin cancer screening.    Patient is otherwise feeling well, without additional skin concerns.    Labs Reviewed:  N/A    Physical Exam:  Vitals: LMP 07/24/2023 (Approximate)   SKIN: Full body skin exam excluding the genitals was performed including face, scalp, neck, ears, chest, back, bilateral arms, hands, bilateral legs, feet, and buttocks.   - There is fine scaling on nose and cheeks  - There is a 3 mm pearly, flesh colored papule on the right shoulder.   - There are dome shaped bright red papules on the trunk and extremities .   - Multiple regular brown pigmented macules and papules are identified on the trunk and extremities. .   - Scattered brown macules on sun exposed areas.  - Waxy stuck on papules and plaques on trunk and extremities.     - No other lesions of concern on areas examined.     Medications:  Current Outpatient Medications   Medication    ALPRAZolam (XANAX) 0.5 MG tablet    ALPRAZolam (XANAX) 0.5 MG tablet    glucosamine-chondroitin 500-400 mg cap    MEDICATION CANNOT BE REORDERED - PLEASE MANUALLY REORDER AND DISCONTINUE THE OLD ORDER    PFIZER-BIONT COVID-19 VAC-KAIDEN 30 MCG/0.3ML injection    rosuvastatin (CRESTOR) 10 MG tablet    SHINGRIX injection    tamsulosin (FLOMAX) 0.4 MG capsule     No current facility-administered medications for this visit.        Past Medical History:   Past Medical History:    Diagnosis Date    Infection due to 2019 novel coronavirus 05/2022          CC Referred Self, MD  No address on file on close of this encounter.

## 2023-08-18 NOTE — NURSING NOTE
Dermatology Rooming Note    Tate Brothers's goals for this visit include:   Chief Complaint   Patient presents with    Derm Problem     Patient reports lesions of concern on the scalp. The patient reports a history of skin cancer and would like a full body skin check.      Talya Bautista LPN

## 2023-08-23 LAB
PATH REPORT.COMMENTS IMP SPEC: NORMAL
PATH REPORT.COMMENTS IMP SPEC: NORMAL
PATH REPORT.FINAL DX SPEC: NORMAL
PATH REPORT.GROSS SPEC: NORMAL
PATH REPORT.MICROSCOPIC SPEC OTHER STN: NORMAL
PATH REPORT.RELEVANT HX SPEC: NORMAL

## 2023-08-28 ENCOUNTER — TELEPHONE (OUTPATIENT)
Dept: DERMATOLOGY | Facility: CLINIC | Age: 63
End: 2023-08-28
Payer: COMMERCIAL

## 2023-08-28 NOTE — TELEPHONE ENCOUNTER
[facility] Call Center    Phone Message    May a detailed message be left on voicemail: no     Reason for Call: Tate Torres calling about Medications and Questions:  calcipotriene (DOVONOX) 0.005 % external cream & fluorouracil (EFUDEX) 5 % external cream.  Please call: [phone] Tate     Action Taken: Message routed to:  [facility] ([facility]): DERM    Travel Screening: Not Applicable

## 2023-08-28 NOTE — TELEPHONE ENCOUNTER
Called pt and discussed below message. Pt stated he picked up the medication and it was over $300. Advised if he can get the medication returned Dr. Kline can order it through [facility] for ~$75. Pt will try to return it and let us know if we should order through [facility].   Audrey Comer RN

## 2023-10-06 ENCOUNTER — MYC MEDICAL ADVICE (OUTPATIENT)
Dept: FAMILY MEDICINE | Facility: CLINIC | Age: 63
End: 2023-10-06
Payer: COMMERCIAL

## 2023-10-06 DIAGNOSIS — F41.9 ANXIETY: ICD-10-CM

## 2023-10-06 DIAGNOSIS — H93.A3 PULSATILE TINNITUS OF BOTH EARS: ICD-10-CM

## 2023-10-08 RX ORDER — ALPRAZOLAM 0.5 MG
.5-1 TABLET ORAL AT BEDTIME
Qty: 35 TABLET | Refills: 5 | Status: SHIPPED | OUTPATIENT
Start: 2023-10-13 | End: 2024-04-25

## 2023-10-08 NOTE — TELEPHONE ENCOUNTER
Most recent prescription does note have note to follow up before further refills (the prescription before this one did) .  Also CSA say 12 month Follow up . However prescriptison for benzodiazepines cannot be for more the 6 montsh at a time    ALPRAZolam (XANAX) 0.5 MG tablet 35 tablet 5 5/4/2023  No   Sig - Route: Take 1-2 tablets (0.5-1 mg) by mouth At Bedtime Max 35 pills per month - Oral   Sent to pharmacy as: ALPRAZolam 0.5 MG Oral Tablet (XANAX)   Class: E-Prescribe   Order: [id number]   E-Prescribing Status: Receipt confirmed by pharmacy (4/11/2023  9:39 AM CDT)       I did increase his prescription to 35 pills per month    09/14/2023 04/11/2023 3 Alprazolam 0.5 Mg Tablet 35.00 30 Ma Win [id number] Gra (5484) 4/5 1.17 [place]   08/09/2023 04/11/2023 3 Alprazolam 0.5 Mg Tablet 35.00 30 Ma Win [id number] Gra (3706) 3/5 1.17 [place]   07/07/2023 04/11/2023 3 Alprazolam 0.5 Mg Tablet 35.00 30 Ma Win [id number] Gra (1453) 2/5 1.17 [place]   06/05/2023 04/11/2023 3 Alprazolam 0.5 Mg Tablet 35.00 30 Ma Win [id number] Gra (5683) 1/5 1.17 [place]   05/02/2023 04/11/2023 3 Alprazolam 0.5 Mg Tablet 35.00 30 Ma Win [id number] Gra (2827) 0/5

## 2023-12-20 ENCOUNTER — OFFICE VISIT (OUTPATIENT)
Dept: DERMATOLOGY | Facility: CLINIC | Age: 63
End: 2023-12-20
Payer: COMMERCIAL

## 2023-12-20 DIAGNOSIS — L82.1 SEBORRHEIC KERATOSES: ICD-10-CM

## 2023-12-20 DIAGNOSIS — D49.2 NEOPLASM OF SKIN: ICD-10-CM

## 2023-12-20 DIAGNOSIS — L57.0 AK (ACTINIC KERATOSIS): Primary | ICD-10-CM

## 2023-12-20 PROCEDURE — 99213 OFFICE O/P EST LOW 20 MIN: CPT | Mod: 25 | Performed by: DERMATOLOGY

## 2023-12-20 PROCEDURE — 17000 DESTRUCT PREMALG LESION: CPT | Mod: GC | Performed by: DERMATOLOGY

## 2023-12-20 PROCEDURE — 17003 DESTRUCT PREMALG LES 2-14: CPT | Mod: GC | Performed by: DERMATOLOGY

## 2023-12-20 ASSESSMENT — PAIN SCALES - GENERAL: PAINLEVEL: NO PAIN (0)

## 2023-12-20 NOTE — NURSING NOTE
Chief Complaint   Patient presents with    Skin Check     Patient reports some healing of the treated sites.      Talya Bautista LPN

## 2023-12-20 NOTE — PATIENT INSTRUCTIONS
Skin looks great after the creams.    OK to do the creams again to the cheeks later in the winter if you desire     If that spot on your right cheek is still there is 1-2 months, let us know and we can evaluate it.    Cryotherapy Instructions    For the areas treated with liquid nitrogen (cryotherapy or freezing) today, they are expected to get pink, puffy, and perhaps even blister. The area should then crust up and fall off and the goal is to have nice new skin underneath. There is nothing special that you need to do for these areas. You can wash them as you do normal skin.     Sometimes a blister develops; if a blister does develop do NOT pop it. However, if it breaks open on its own, be sure to wash it with soap and water daily and put plain vasaline or petroleum jelly and a bandaid on it until the skin is healed.     Please call the clinic if you have any questions or concerns.

## 2023-12-20 NOTE — PROGRESS NOTES
[facility] Dermatology Note  Encounter Date: Dec 20, 2023  Office Visit     Dermatology Problem List:  1. Seborrheic dermatitis  - Ketoconazole cream, shampoo  - future: add hydrocortisone 2.5% cream  2. AKs.  - 5FU/C to nose/cheeks 8/2023  - HAK, R post shoulder, s/p shave bx 8/18/23    ____________________________________________    Assessment & Plan:    # Actinic keratosis x3. Good response to field therapy this fall. Few residual lesions. OK to do to cheeks if desires after the holidays,  - Cryotherapy performed today (see procedure note(s) below).      # Seborrheic keratosis, inflamed. Offered cryotherapy at this time which he would prefer to avoid as thinks the irritation is improving.   - NTD: No further management at this time.  - Consider cryotherapy in future    # traumatic erosion right superomedial cheek. No evidence for malignancy today. States it is healing well. Will contact in 1-2 months for evaluation if still present and we can please add on to clinic in expedited fashion.    Procedures Performed:   - Cryotherapy procedure note, location(s): nose and left cheek. After verbal consent and discussion of risks and benefits including, but not limited to, dyspigmentation/scar, blister, and pain, 3 lesion(s) was(were) treated with 1-2 mm freeze border for 1-2 cycles with liquid nitrogen. Post cryotherapy instructions were provided.      Follow-up: 8 month(s) in-person, or earlier for new or changing lesions    Staff and Resident:    I, Joshua Awad MD, discussed and evaluated the patient with Dr. Kline.    Staff Physician Comments:   I saw and evaluated the patient with the resident and I agree with the assessment and plan.  I was present for the entire minor procedure and examination.    Alejandra Kline MD    Department of Dermatology  [facility]: Phone: [phone], Fax:  [phone]  12/27/2023     ____________________________________________    CC: Skin Check (Patient reports some healing of the treated sites. )    HPI:  Mr. Tate Brothers is a(n) 63 year old male who presents today as a return patient for field therapy re-check. Did 5-Follow-up/calcipotriene to nose for 6 days and got red and irritated. Has a spot on sideburn he is wondering about. Healing spot on right cheek.      Patient is otherwise feeling well, without additional skin concerns.    Labs Reviewed:  none    Physical Exam:  Vitals: There were no vitals taken for this visit.  SKIN: Focused examination of face was performed.  - 1 gritty pink papule of nasal bridge and left cheek  - Right sideburn with waxy stuck on papule with erythema  - right superomedial cheek with linear erosion with no vessels or rolled borders  - No other lesions of concern on areas examined.     Medications:  Current Outpatient Medications   Medication    ALPRAZolam (XANAX) 0.5 MG tablet    calcipotriene (DOVONOX) 0.005 % external cream    fluorouracil (EFUDEX) 5 % external cream    glucosamine-chondroitin 500-400 mg cap    ketoconazole (NIZORAL) 2 % external cream    MEDICATION CANNOT BE REORDERED - PLEASE MANUALLY REORDER AND DISCONTINUE THE OLD ORDER    PFIZER-Adcade COVID-19 VAC-KAIDEN 30 MCG/0.3ML injection    rosuvastatin (CRESTOR) 10 MG tablet    SHINGRIX injection    tamsulosin (FLOMAX) 0.4 MG capsule     No current facility-administered medications for this visit.      Past Medical History:   Patient Active Problem List   Diagnosis    History of prostate cancer    Insomnia due to medical condition    Controlled substance agreement signed    Chronic pain of left knee     Past Medical History:   Diagnosis Date    Infection due to 2019 novel coronavirus 05/2022        CC No referring provider defined for this encounter. on close of this encounter.

## 2023-12-20 NOTE — LETTER
12/20/2023       RE: Tate Brothers  [address] [place] [address]     Dear Colleague,    Thank you for referring your patient, Tate Brothers, to the [facility] [place] [facility]. Please see a copy of my visit note below.    [facility] Dermatology Note  Encounter Date: Dec 20, 2023  Office Visit     Dermatology Problem List:  1. Seborrheic dermatitis  - Ketoconazole cream, shampoo  - future: add hydrocortisone 2.5% cream  2. AKs.  - 5FU/C to nose/cheeks 8/2023  - HAK, R post shoulder, s/p shave bx 8/18/23    ____________________________________________    Assessment & Plan:    # Actinic keratosis x3. Good response to field therapy this fall. Few residual lesions. OK to do to cheeks if desires after the holidays,  - Cryotherapy performed today (see procedure note(s) below).      # Seborrheic keratosis, inflamed. Offered cryotherapy at this time which he would prefer to avoid as thinks the irritation is improving.   - NTD: No further management at this time.  - Consider cryotherapy in future    # traumatic erosion right superomedial cheek. No evidence for malignancy today. States it is healing well. Will contact in 1-2 months for evaluation if still present and we can please add on to clinic in expedited fashion.    Procedures Performed:   - Cryotherapy procedure note, location(s): nose and left cheek. After verbal consent and discussion of risks and benefits including, but not limited to, dyspigmentation/scar, blister, and pain, 3 lesion(s) was(were) treated with 1-2 mm freeze border for 1-2 cycles with liquid nitrogen. Post cryotherapy instructions were provided.      Follow-up: 8 month(s) in-person, or earlier for new or changing lesions    Staff and Resident:    I, Joshua Awad MD, discussed and evaluated the patient with Dr. Kline.    Staff Physician Comments:   I saw and evaluated the  patient with the resident and I agree with the assessment and plan.  I was present for the entire minor procedure and examination.    Alejandra Kline MD    Department of Dermatology  [facility]: Phone: [phone], Fax: [phone]  12/27/2023     ____________________________________________    CC: Skin Check (Patient reports some healing of the treated sites. )    HPI:  Mr. Tate Brothers is a(n) 63 year old male who presents today as a return patient for field therapy re-check. Did 5-Follow-up/calcipotriene to nose for 6 days and got red and irritated. Has a spot on sideburn he is wondering about. Healing spot on right cheek.      Patient is otherwise feeling well, without additional skin concerns.    Labs Reviewed:  none    Physical Exam:  Vitals: There were no vitals taken for this visit.  SKIN: Focused examination of face was performed.  - 1 gritty pink papule of nasal bridge and left cheek  - Right sideburn with waxy stuck on papule with erythema  - right superomedial cheek with linear erosion with no vessels or rolled borders  - No other lesions of concern on areas examined.     Medications:  Current Outpatient Medications   Medication    ALPRAZolam (XANAX) 0.5 MG tablet    calcipotriene (DOVONOX) 0.005 % external cream    fluorouracil (EFUDEX) 5 % external cream    glucosamine-chondroitin 500-400 mg cap    ketoconazole (NIZORAL) 2 % external cream    MEDICATION CANNOT BE REORDERED - PLEASE MANUALLY REORDER AND DISCONTINUE THE OLD ORDER    PFIZER-BIONT COVID-19 VAC-KAIDEN 30 MCG/0.3ML injection    rosuvastatin (CRESTOR) 10 MG tablet    SHINGRIX injection    tamsulosin (FLOMAX) 0.4 MG capsule     No current facility-administered medications for this visit.      Past Medical History:   Patient Active Problem List   Diagnosis    History of prostate cancer    Insomnia due to medical condition    Controlled  substance agreement signed    Chronic pain of left knee     Past Medical History:   Diagnosis Date    Infection due to 2019 novel coronavirus 05/2022        CC No referring provider defined for this encounter. on close of this encounter.

## 2024-03-12 ENCOUNTER — PATIENT OUTREACH (OUTPATIENT)
Dept: CARE COORDINATION | Facility: CLINIC | Age: 64
End: 2024-03-12
Payer: COMMERCIAL

## 2024-03-26 ENCOUNTER — PATIENT OUTREACH (OUTPATIENT)
Dept: CARE COORDINATION | Facility: CLINIC | Age: 64
End: 2024-03-26
Payer: COMMERCIAL

## 2024-04-23 SDOH — HEALTH STABILITY: PHYSICAL HEALTH: ON AVERAGE, HOW MANY MINUTES DO YOU ENGAGE IN EXERCISE AT THIS LEVEL?: 60 MIN

## 2024-04-23 SDOH — HEALTH STABILITY: PHYSICAL HEALTH: ON AVERAGE, HOW MANY DAYS PER WEEK DO YOU ENGAGE IN MODERATE TO STRENUOUS EXERCISE (LIKE A BRISK WALK)?: 5 DAYS

## 2024-04-23 ASSESSMENT — SOCIAL DETERMINANTS OF HEALTH (SDOH): HOW OFTEN DO YOU GET TOGETHER WITH FRIENDS OR RELATIVES?: MORE THAN THREE TIMES A WEEK

## 2024-04-25 ENCOUNTER — OFFICE VISIT (OUTPATIENT)
Dept: FAMILY MEDICINE | Facility: CLINIC | Age: 64
End: 2024-04-25
Payer: COMMERCIAL

## 2024-04-25 VITALS
OXYGEN SATURATION: 96 % | HEIGHT: 70 IN | SYSTOLIC BLOOD PRESSURE: 108 MMHG | RESPIRATION RATE: 12 BRPM | DIASTOLIC BLOOD PRESSURE: 66 MMHG | BODY MASS INDEX: 23.42 KG/M2 | HEART RATE: 54 BPM | TEMPERATURE: 97.2 F | WEIGHT: 163.6 LBS

## 2024-04-25 DIAGNOSIS — Z85.46 HISTORY OF PROSTATE CANCER: ICD-10-CM

## 2024-04-25 DIAGNOSIS — Z00.00 ENCOUNTER FOR MEDICARE ANNUAL WELLNESS EXAM: ICD-10-CM

## 2024-04-25 DIAGNOSIS — Z79.899 CONTROLLED SUBSTANCE AGREEMENT SIGNED: ICD-10-CM

## 2024-04-25 DIAGNOSIS — Z13.1 SCREENING FOR DIABETES MELLITUS: ICD-10-CM

## 2024-04-25 DIAGNOSIS — H93.A3 PULSATILE TINNITUS OF BOTH EARS: ICD-10-CM

## 2024-04-25 DIAGNOSIS — Z12.5 SCREENING FOR PROSTATE CANCER: ICD-10-CM

## 2024-04-25 DIAGNOSIS — R93.1 ELEVATED CORONARY ARTERY CALCIUM SCORE: Primary | ICD-10-CM

## 2024-04-25 DIAGNOSIS — F51.01 PRIMARY INSOMNIA: ICD-10-CM

## 2024-04-25 DIAGNOSIS — E78.5 HYPERLIPIDEMIA LDL GOAL <70: ICD-10-CM

## 2024-04-25 LAB
AMPHETAMINES UR QL SCN: ABNORMAL
BARBITURATES UR QL SCN: ABNORMAL
BENZODIAZ UR QL SCN: ABNORMAL
BZE UR QL SCN: ABNORMAL
CANNABINOIDS UR QL SCN: ABNORMAL
FENTANYL UR QL: ABNORMAL
OPIATES UR QL SCN: ABNORMAL
PCP QUAL URINE (ROCHE): ABNORMAL

## 2024-04-25 PROCEDURE — 91320 SARSCV2 VAC 30MCG TRS-SUC IM: CPT | Performed by: FAMILY MEDICINE

## 2024-04-25 PROCEDURE — G0438 PPPS, INITIAL VISIT: HCPCS | Performed by: FAMILY MEDICINE

## 2024-04-25 PROCEDURE — 90480 ADMN SARSCOV2 VAC 1/ONLY CMP: CPT | Performed by: FAMILY MEDICINE

## 2024-04-25 PROCEDURE — 80307 DRUG TEST PRSMV CHEM ANLYZR: CPT | Performed by: FAMILY MEDICINE

## 2024-04-25 PROCEDURE — 99214 OFFICE O/P EST MOD 30 MIN: CPT | Mod: 25 | Performed by: FAMILY MEDICINE

## 2024-04-25 RX ORDER — ALPRAZOLAM 0.5 MG
.5-1 TABLET ORAL AT BEDTIME
Qty: 35 TABLET | Refills: 5 | Status: SHIPPED | OUTPATIENT
Start: 2024-04-25

## 2024-04-25 RX ORDER — TAMSULOSIN HYDROCHLORIDE 0.4 MG/1
CAPSULE ORAL
Qty: 90 CAPSULE | Refills: 3 | Status: SHIPPED | OUTPATIENT
Start: 2024-04-25

## 2024-04-25 RX ORDER — ROSUVASTATIN CALCIUM 10 MG/1
10 TABLET, COATED ORAL DAILY
Qty: 90 TABLET | Refills: 3 | Status: SHIPPED | OUTPATIENT
Start: 2024-04-25

## 2024-04-25 NOTE — PATIENT INSTRUCTIONS
As your pharmacy about PCV20 (pneumonia vaccine)     ---      Preventive Care Advice   This is general advice given by our system to help you stay healthy. However, your care team may have specific advice just for you. Please talk to your care team about your preventive care needs.  Nutrition    Eat 5 or more servings of fruits and vegetables each day.    Try wheat bread, brown rice and whole grain pasta (instead of white bread, rice, and pasta).    Get enough calcium and vitamin D. Check the label on foods and aim for 100% of the RDA (recommended daily allowance).  Lifestyle    Exercise at least 150 minutes each week   (30 minutes a day, 5 days a week).    Do muscle strengthening activities 2 days a week. These help control your weight and prevent disease.    No smoking.    Wear sunscreen to prevent skin cancer.    Have a dental exam and cleaning every 6 months.  Yearly exams  See your health care team every year to talk about:    Any changes in your health.    Any medicines your care team has prescribed.    Preventive care, family planning, and ways to prevent chronic diseases.  Shots (vaccines)     HPV shots (up to age 26), if you've never had them before.    Hepatitis B shots (up to age 59), if you've never had them before.    COVID-19 shot: Get this shot when it's due.    Flu shot: Get a flu shot every year.    Tetanus shot: Get a tetanus shot every 10 years.    Pneumococcal, hepatitis A, and RSV shots: Ask your care team if you need these based on your risk.    Shingles shot (for age 50 and up).  General health tests    Diabetes screening:  ? Starting at age 35, Get screened for diabetes at least every 3 years.  ? If you are younger than age 35, ask your care team if you should be screened for diabetes.    Cholesterol test: At age 39, start having a cholesterol test every 5 years, or more often if advised.    Bone density scan (DEXA): At age 50, ask your care team if you should have this scan for osteoporosis  (brittle bones).    Hepatitis C: Get tested at least once in your life.  STIs (sexually transmitted infections)    Before age 24: Ask your care team if you should be screened for STIs.    After age 24: Get screened for STIs if you're at risk. You are at risk for STIs (including HIV) if:  ? You are sexually active with more than one person.  ? You don't use condoms every time.  ? You or a partner was diagnosed with a sexually transmitted infection.    If you are at risk for HIV, ask about PrEP medicine to prevent HIV.    Get tested for HIV at least once in your life, whether you are at risk for HIV or not.  Cancer screening tests    Cervical cancer screening: If you have a cervix, begin getting regular cervical cancer screening tests at age 21. Most people who have regular screenings with normal results can stop after age 65. Talk about this with your provider.    Breast cancer scan (mammogram): If you've ever had breasts, begin having regular mammograms starting at age 40. This is a scan to check for breast cancer.    Colon cancer screening: It is important to start screening for colon cancer at age 45.  ? Have a colonoscopy test every 10 years (or more often if you're at risk) Or, ask your provider about stool tests like a FIT test every year or Cologuard test every 3 years.  ? To learn more about your testing options, visit: https://www.[employer]/244082.pdf.  ? For help making a decision, visit: https://bit.ly/bg59140.    Prostate cancer screening test: If you have a prostate and are age 55 to 69, ask your provider if you would benefit from a yearly prostate cancer screening test.    Lung cancer screening: If you are a current or former smoker age 50 to 80, ask your care team if ongoing lung cancer screenings are right for you.  For informational purposes only. Not to replace the advice of your health care provider. Copyright   2023 [employer]. All rights reserved. Clinically reviewed by the [facility] Transitions [facility]. [employer] 108008 - REV 01/24.    Hearing Loss: Care Instructions  Overview     Hearing loss is a sudden or slow decrease in how well you hear. It can range from slight to profound. Permanent hearing loss can occur with aging. It also can happen when you are exposed long-term to loud noise. Examples include listening to loud music, riding motorcycles, or being around other loud machines.  Hearing loss can affect your work and home life. It can make you feel lonely or depressed. You may feel that you have lost your independence. But hearing aids and other devices can help you hear better and feel connected to others.  Follow-up care is a key part of your treatment and safety. Be sure to make and go to all appointments, and call your doctor if you are having problems. It's also a good idea to know your test results and keep a list of the medicines you take.  How can you care for yourself at home?    Avoid loud noises whenever possible. This helps keep your hearing from getting worse.    Always wear hearing protection around loud noises.    Wear a hearing aid as directed.  ? A professional can help you pick a hearing aid that will work best for you.  ? You can also get hearing aids over the counter for mild to moderate hearing loss.    Have hearing tests as your doctor suggests. They can show whether your hearing has changed. Your hearing aid may need to be adjusted.    Use other devices as needed. These may include:  ? Telephone amplifiers and hearing aids that can connect to a television, stereo, radio, or microphone.  ? Devices that use lights or vibrations. These alert you to the doorbell, a ringing telephone, or a baby monitor.  ? Television closed-captioning. This shows the words at the bottom of the screen. Most new TVs can do this.  ? TTY (text telephone). This lets you type messages back and forth on the telephone instead of talking or listening. These devices are also  "called TDD. When messages are typed on the keyboard, they are sent over the phone line to a receiving TTY. The message is shown on a monitor.    Use text messaging, social media, and email if it is hard for you to communicate by telephone.    Try to learn a listening technique called speechreading. It is not lipreading. You pay attention to people's gestures, expressions, posture, and tone of voice. These clues can help you understand what a person is saying. Face the person you are talking to, and have them face you. Make sure the lighting is good. You need to see the other person's face clearly.    Think about counseling if you need help to adjust to your hearing loss.  When should you call for help?  Watch closely for changes in your health, and be sure to contact your doctor if:      You think your hearing is getting worse.       You have new symptoms, such as dizziness or nausea.   Where can you learn more?  Go to https://www.[employer]/patiented  Enter R798 in the search box to learn more about \"Hearing Loss: Care Instructions.\"  Current as of: September 27, 2023               Content Version: 14.0    7585-1781 [employer]   Care instructions adapted under license by your healthcare professional. If you have questions about a medical condition or this instruction, always ask your healthcare professional. [employer] disclaims any warranty or liability for your use of this information.      Learning About Stress  What is stress?     Stress is your body's response to a hard situation. Your body can have a physical, emotional, or mental response. Stress is a fact of life for most people, and it affects everyone differently. What causes stress for you may not be stressful for someone else.  A lot of things can cause stress. You may feel stress when you go on a job interview, take a test, or run a race. This kind of short-term stress is normal and even useful. It can help you if you " need to work hard or react quickly. For example, stress can help you finish an important job on time.  Long-term stress is caused by ongoing stressful situations or events. Examples of long-term stress include long-term health problems, ongoing problems at work, or conflicts in your family. Long-term stress can harm your health.  How does stress affect your health?  When you are stressed, your body responds as though you are in danger. It makes hormones that speed up your heart, make you breathe faster, and give you a burst of energy. This is called the fight-or-flight stress response. If the stress is over quickly, your body goes back to normal and no harm is done.  But if stress happens too often or lasts too long, it can have bad effects. Long-term stress can make you more likely to get sick, and it can make symptoms of some diseases worse. If you tense up when you are stressed, you may develop neck, shoulder, or low back pain. Stress is linked to high blood pressure and heart disease.  Stress also harms your emotional health. It can make you urban, tense, or depressed. Your relationships may suffer, and you may not do well at work or school.  What can you do to manage stress?  You can try these things to help manage stress:     Do something active. Exercise or activity can help reduce stress. Walking is a great way to get started. Even everyday activities such as housecleaning or yard work can help.    Try yoga or melody chi. These techniques combine exercise and meditation. You may need some training at first to learn them.    Do something you enjoy. For example, listen to music or go to a movie. Practice your hobby or do volunteer work.    Meditate. This can help you relax, because you are not worrying about what happened before or what may happen in the future.    Do guided imagery. Imagine yourself in any setting that helps you feel calm. You can use online videos, books, or a teacher to guide you.    Do  "breathing exercises. For example:  ? From a standing position, bend forward from the waist with your knees slightly bent. Let your arms dangle close to the floor.  ? Breathe in slowly and deeply as you return to a standing position. Roll up slowly and lift your head last.  ? Hold your breath for just a few seconds in the standing position.  ? Breathe out slowly and bend forward from the waist.    Let your feelings out. Talk, laugh, cry, and express anger when you need to. Talking with supportive friends or family, a counselor, or a karma leader about your feelings is a healthy way to relieve stress. Avoid discussing your feelings with people who make you feel worse.    Write. It may help to write about things that are bothering you. This helps you find out how much stress you feel and what is causing it. When you know this, you can find better ways to cope.  What can you do to prevent stress?  You might try some of these things to help prevent stress:    Manage your time. This helps you find time to do the things you want and need to do.    Get enough sleep. Your body recovers from the stresses of the day while you are sleeping.    Get support. Your family, friends, and community can make a difference in how you experience stress.    Limit your news feed. Avoid or limit time on social media or news that may make you feel stressed.    Do something active. Exercise or activity can help reduce stress. Walking is a great way to get started.  Where can you learn more?  Go to https://www.[employer].net/patiented  Enter N032 in the search box to learn more about \"Learning About Stress.\"  Current as of: October 24, 2023               Content Version: 14.0    9108-9649 [employer].   Care instructions adapted under license by your healthcare professional. If you have questions about a medical condition or this instruction, always ask your healthcare professional. [employer] disclaims any warranty " or liability for your use of this information.

## 2024-04-25 NOTE — LETTER
[facility]  04/25/24  Patient: Tate Brothers  YOB: 1960  Medical Record Number: [id number]                                                                                  Non-Opioid Controlled Substance Agreement    This is an agreement between you and your provider regarding safe and appropriate use of controlled substances prescribed by your care team. Controlled substances are?medicines that can cause physical and mental dependence (abuse).     There are strict laws about having and using these medicines. We here at [facility] are  committed to working with you in your efforts to get better. To support you in this work, we'll help you schedule regular office appointments for medicine refills. If we must cancel or change your appointment for any reason, we'll make sure you have enough medicine to last until your next appointment.     As a Provider, I will:   Listen carefully to your concerns while treating you with respect.   Recommend a treatment plan that I believe is in your best interest and may involve therapies other than medicine.    Talk with you often about the possible benefits and the risk of harm of any medicine that we prescribe for you.  Assess the safety of this medicine and check how well it works.    Provide a plan on how to taper (discontinue or go off) using this medicine if the decision is made to stop its use.      ::  As a Patient, I understand controlled substances:     Are prescribed by my care provider to help me function or work and manage my condition(s).?  Are strong medicines and can cause serious side effects.     Need to be taken exactly as prescribed.?Combining controlled substances with certain medicines or chemicals (such as illegal drugs, alcohol, sedatives, sleeping pills, and benzodiazepines) can be dangerous or even fatal.? If I stop taking my medicines suddenly, I may have severe withdrawal symptoms.     Diagnosis:  insomnia  Medication : Alprazolam 0.5 mg nightly  - half pill extra 10 nights per month  Quantity per month: insomnia  Number of refills before follow up visit: 12    The risks, benefits, and side effects of these medicine(s) were explained to me. I agree that:    I will take part in other treatments as advised by my care team. This may be psychiatry or counseling, physical therapy, behavioral therapy, group treatment or a referral to specialist.  I will keep all my appointments and understand this is part of the monitoring of controlled substances.?My care team may require an office visit for EVERY controlled substance refill. If I miss appointments or don t follow instructions, my care team may stop my medicine  I will take my medicines as prescribed. I will not change the dose or schedule unless my care team tells me to. There will be no refills if I run out early.      I may be asked to come to the clinic and complete a urine drug test or complete a pill count. If I don t give a urine sample or participate in a pill count, the care team may stop my medicine.  I will only receive controlled substance prescriptions from this clinic. If I am treated by another provider, I will tell them that I am taking controlled substances and that I have a treatment agreement with this provider. I will inform my [facility] care team within one business day if I am given a prescription for any controlled substance by another healthcare provider. My [facility] care team can contact other providers and pharmacists about my use of any medicines.  It is up to me to make sure that I don't run out of my medicines on weekends or holidays.?If my care team is willing to refill my prescription without a visit, I must request refills only during office hours. Refills may take up to 3 business days to process. I will use one pharmacy to fill all my controlled substance prescriptions. I will notify the clinic about any changes  to my insurance or medicine availability.  I am responsible for my prescriptions. If the medicine/prescription is lost, stolen or destroyed, it will not be replaced.?I also agree not to share controlled substance medicines with anyone.   I am aware I should not use any illegal or recreational drugs. I agree not to drink alcohol unless my care team says I can.   If I enroll in the [place] Medical Cannabis program, I will tell my care team before my next refill.  I will tell my care team right away if I become pregnant, have a new medical problem treated outside of my regular clinic, or have a change in my medicines.   I understand that this medicine can affect my thinking, judgment and reaction time.? Alcohol and drugs affect the brain and body, which can affect the safety of my driving. Being under the influence of alcohol or drugs can affect my decision-making, behaviors, personal safety and the safety of others. Driving while impaired (DWI) can occur if a person is driving, operating or in physical control of a car, motorcycle, boat, snowmobile, ATV, motorbike, off-road vehicle or any other motor vehicle ([place] Statute 169A.20). I understand the risk if I choose to drive or operate any vehicle or machinery.    I understand that if I do not follow any of the conditions above, my prescriptions or treatment may be stopped or changed.   I agree that my provider, clinic care team and pharmacy may work with any city, state or federal law enforcement agency that investigates the misuse, sale or other diversion of my controlled medicine. I will allow my provider to discuss my care with, or share a copy of, this agreement with any other treating provider, pharmacy or emergency room where I receive care.     I have read this agreement and have asked questions about anything I did not understand.    ________________________________________________________  Patient Signature - Tate Brothers     ___________________                    Date     ________________________________________________________  Provider Signature - Sonali M. Bangor, MD       ___________________                   Date     ________________________________________________________  Witness Signature (required if provider not present while patient signing)          ___________________                   Date

## 2024-04-25 NOTE — PROGRESS NOTES
Preventive Care Visit  [facility]  Sonali Zacarias MD, Family Medicine  Apr 25, 2024      Assessment & Plan     Encounter for Medicare annual wellness exam    Insomnia and anxiety  Controlled with daily alprazolam - about 10 days out of the month he takes and extra half pill  - ALPRAZolam (XANAX) 0.5 MG tablet  Dispense: 35 tablet; Refill: 5    Controlled substance agreement signed  For above  - Urine Drug Screen    Pulsatile tinnitus of both ears  Also managed with   - ALPRAZolam (XANAX) 0.5 MG tablet  Dispense: 35 tablet; Refill: 5    History of prostate cancer  - tamsulosin (FLOMAX) 0.4 MG capsule  Dispense: 90 capsule; Refill: 3    Elevated coronary artery calcium score  Hyperlipidemia LDL goal <70  - Lipid panel reflex to direct LDL Fasting  - rosuvastatin (CRESTOR) 10 MG tablet  Dispense: 90 tablet; Refill: 3    Screening for diabetes mellitus  - Glucose    Screening for prostate cancer   - PSA, screen    Counseling  Appropriate preventive services were discussed with this patient, including applicable screening as appropriate for fall prevention, nutrition, .  Checklist reviewing preventive services available has been given to the patient.  Reviewed patient's diet, addressing concerns and/or questions.   He is at risk for psychosocial distress and has been provided with information to reduce risk.   The patient was provided with written information regarding signs of hearing loss.     Patient specific instructions:   As your pharmacy about PCV20 (pneumonia vaccine)     Return in about 1 year (around 4/25/2025) for Routine preventive, or earlier as needed.      Brandon Ybarra is a 64 year old, presenting for the following:  Annual Visit        4/25/2024    12:54 PM   Additional Questions   Roomed by YULISSA Elena     Health Care Directive  Patient does not have a Health Care Directive or Living Will: Discussed advance care planning with patient; information given to patient to  review.    HPI      Chronic pain of left knee - it's been pretty good - vadim to physical therapy and he showed me exercises to do.  He was running quite a bi tand pulled calf muscle - took a week off - then pulled again  Will do walking and biking for a while -  - if no improvement I can re refer to PT  No red flags, exam normal.  On the same leg had previous issues with achilles tendon strain and had physical therapy for that.  Will see how thing go       Anxiety and insomnia  Controlled on  ALPRAZolam (XANAX) 0.5 MG Take 1-2 tablets (0.5-1 mg) by mouth At Bedtime Max 35 pills per month     Pulsatile tinnitus of both ears -  It's usually there, though not every night - .Doesn't bother him until he has to go to bed. Alprazolam also helps with this      History of prostate cancer/urinary hesitancy- takes tamsulosin - no low blood pressures    Social History: Tate is a former  who is  and has 2 children.  His daughter recently turned 32.  His 26-year-old son is on the spectrum and working to become more independent -he has his own            4/23/2024   General Health   How would you rate your overall physical health? Excellent   Feel stress (tense, anxious, or unable to sleep) Only a little   (!) STRESS CONCERN      4/23/2024   Nutrition   Diet: Regular (no restrictions)         4/23/2024   Exercise   Days per week of moderate/strenuous exercise 5 days   Average minutes spent exercising at this level 60 min         4/23/2024   Social Factors   Frequency of gathering with friends or relatives More than three times a week   Worry food won't last until get money to buy more No   Food not last or not have enough money for food? No   Do you have housing?  Yes   Are you worried about losing your housing? No   Lack of transportation? No   Unable to get utilities (heat,electricity)? No         4/25/2024   Fall Risk   Fallen 2 or more times in the past year? No   Trouble with walking or balance? No           4/23/2024   Activities of Daily Living- Home Safety   Needs help with the following daily activities None of the above   Safety concerns in the home None of the above         4/23/2024   Dental   Dentist two times every year? Yes         4/23/2024   Hearing Screening   Hearing concerns? (!) IT'S HARDER TO UNDERSTAND WOMEN'S VOICES THAN MEN'S VOICES.    (!) IT'S HARD TO FOLLOW A CONVERSATION IN A NOISY RESTAURANT OR CROWDED ROOM.   One hearing aid in   Last assessment was 12/202 4/23/2024   Driving Risk Screening   Patient/family members have concerns about driving No         4/23/2024   General Alertness/Fatigue Screening   Have you been more tired than usual lately? No         4/23/2024   Urinary Incontinence Screening   Bothered by leaking urine in past 6 months No         4/23/2024   TB Screening   Were you born outside of the [place]? No         Today's PHQ-2 Score:       4/25/2024    12:46 PM   PHQ-2 ( 1999 Pfizer)   Q1: Little interest or pleasure in doing things 0   Q2: Feeling down, depressed or hopeless 0   PHQ-2 Score 0   Q1: Little interest or pleasure in doing things Not at all   Q2: Feeling down, depressed or hopeless Not at all   PHQ-2 Score 0           4/23/2024   Substance Use   Alcohol more than 3/day or more than 7/wk No   Do you have a current opioid prescription? No   How severe/bad is pain from 1 to 10? 0/10 (No Pain)   Do you use any other substances recreationally? No     Social History     Tobacco Use    Smoking status: Never    Smokeless tobacco: Never   Substance Use Topics    Alcohol use: Yes     Alcohol/week: 7.0 - 14.0 standard drinks of alcohol    Drug use: Never           4/23/2024   AAA Screening   Family history of Abdominal Aortic Aneurysm (AAA)? No   Last PSA:   Prostate Specific Antigen Screen   Date Value Ref Range Status   04/11/2023 0.57 0.00 - 4.50 ng/mL Final   03/22/2022 0.69 0.00 - 4.50 ug/L Final     ASCVD Risk   The 10-year ASCVD risk score (Parmjit REYES,  "et al., 2019) is: 8.1%    Values used to calculate the score:      Age: 64 years      Sex: Male      Is Non- : No      Diabetic: No      Tobacco smoker: No      Systolic Blood Pressure: 108 mmHg      Is BP treated: No      HDL Cholesterol: 50 mg/dL      Total Cholesterol: 137 mg/dL            Reviewed and updated as needed this visit by Provider                    Current providers sharing in care for this patient include:  Patient Care Team:  Sonali Zacarias MD as PCP - General  Sonali Zacarias MD as Assigned PCP  Fabiana Quigley MD as MD (Otolaryngology)  Farideh Trujillo AuD as Audiologist (Audiology)  Alejandra Kline MD as MD (Dermatology)  Alejandra Kline MD as Assigned Surgical Provider    The following health maintenance items are reviewed in Epic and correct as of today:  Health Maintenance   Topic Date Due    GLUCOSE  Never done    Pneumococcal Vaccine: 65+ Years (1 of 2 - PCV) Never done    RSV VACCINE (Pregnancy & 60+) (1 - 1-dose 60+ series) Never done    LIPID  04/11/2024    ANNUAL REVIEW OF HM ORDERS  04/11/2024    COLORECTAL CANCER SCREENING  01/30/2025    MEDICARE ANNUAL WELLNESS VISIT  04/25/2025    FALL RISK ASSESSMENT  04/25/2025    ADVANCE CARE PLANNING  04/12/2028    DTAP/TDAP/TD IMMUNIZATION (3 - Td or Tdap) 01/25/2029    HEPATITIS C SCREENING  Completed    PHQ-2 (once per calendar year)  Completed    INFLUENZA VACCINE  Completed    ZOSTER IMMUNIZATION  Completed    COVID-19 Vaccine  Completed    IPV IMMUNIZATION  Aged Out    HPV IMMUNIZATION  Aged Out    MENINGITIS IMMUNIZATION  Aged Out    RSV MONOCLONAL ANTIBODY  Aged Out            Objective    Exam  /66 (BP Location: Left arm, Patient Position: Sitting, Cuff Size: Adult Regular)   Pulse 54   Temp 97.2  F (36.2  C) (Tympanic)   Resp 12   Ht 1.778 m (5' 10\")   Wt 74.2 kg (163 lb 9.6 oz)   SpO2 96%   BMI 23.47 kg/m     Estimated body mass index is 23.47 kg/m  as calculated from the " "following:    Height as of this encounter: 1.778 m (5' 10\").    Weight as of this encounter: 74.2 kg (163 lb 9.6 oz).    Physical Exam  General appearance - alert, well appearing, and in no distress  Mental status - normal mood, behavior, speech, dress, motor activity, and thought processes  Eyes -deferred , recent exam   Ears - bilateral TM's and external ear canals normal  Mouth - mucous membranes moist, pharynx normal without lesions  Neck - supple, no significant adenopathy, carotids upstroke normal bilaterally, no bruits, thyroid exam: thyroid is normal in size without nodules or tenderness  Chest - clear to auscultation, no wheezes, rales or rhonchi, symmetric air entry  Heart - normal rate, regular rhythm, normal S1, S2, no murmurs, rubs, clicks or gallops  Abdomen - soft, nontender, nondistended, no masses or organomegaly  Neurological - alert, oriented, normal speech, no focal findings or movement disorder noted, DTR's normal and symmetric  Extremities - peripheral pulses normal, no pedal edema, no clubbing or cyanosis  Skin - no rashes or worrisome lesions          4/25/2024   Mini Cog   Clock Draw Score 2 Normal   3 Item Recall 3 objects recalled   Mini Cog Total Score 5              Signed Electronically by: Sonali Zacarias MD    "

## 2024-04-28 ENCOUNTER — MYC MEDICAL ADVICE (OUTPATIENT)
Dept: FAMILY MEDICINE | Facility: CLINIC | Age: 64
End: 2024-04-28
Payer: COMMERCIAL

## 2024-04-30 ENCOUNTER — LAB (OUTPATIENT)
Dept: LAB | Facility: CLINIC | Age: 64
End: 2024-04-30
Payer: COMMERCIAL

## 2024-04-30 DIAGNOSIS — Z12.5 SCREENING FOR PROSTATE CANCER: ICD-10-CM

## 2024-04-30 DIAGNOSIS — R93.1 ELEVATED CORONARY ARTERY CALCIUM SCORE: ICD-10-CM

## 2024-04-30 DIAGNOSIS — Z13.1 SCREENING FOR DIABETES MELLITUS: ICD-10-CM

## 2024-04-30 LAB
CHOLEST SERPL-MCNC: 154 MG/DL
FASTING STATUS PATIENT QL REPORTED: YES
FASTING STATUS PATIENT QL REPORTED: YES
GLUCOSE SERPL-MCNC: 97 MG/DL (ref 70–99)
HDLC SERPL-MCNC: 41 MG/DL
LDLC SERPL CALC-MCNC: 89 MG/DL
NONHDLC SERPL-MCNC: 113 MG/DL
PSA SERPL DL<=0.01 NG/ML-MCNC: 0.34 NG/ML (ref 0–4.5)
TRIGL SERPL-MCNC: 120 MG/DL

## 2024-04-30 PROCEDURE — 36415 COLL VENOUS BLD VENIPUNCTURE: CPT

## 2024-04-30 PROCEDURE — 80061 LIPID PANEL: CPT

## 2024-04-30 PROCEDURE — 82947 ASSAY GLUCOSE BLOOD QUANT: CPT

## 2024-04-30 PROCEDURE — G0103 PSA SCREENING: HCPCS

## 2024-08-07 ENCOUNTER — OFFICE VISIT (OUTPATIENT)
Dept: DERMATOLOGY | Facility: CLINIC | Age: 64
End: 2024-08-07
Attending: DERMATOLOGY
Payer: COMMERCIAL

## 2024-08-07 DIAGNOSIS — L57.8 ACTINIC SKIN DAMAGE: ICD-10-CM

## 2024-08-07 DIAGNOSIS — D18.01 CHERRY ANGIOMA: ICD-10-CM

## 2024-08-07 DIAGNOSIS — L81.4 LENTIGINES: ICD-10-CM

## 2024-08-07 DIAGNOSIS — L21.9 SEBORRHEIC DERMATITIS: Primary | ICD-10-CM

## 2024-08-07 DIAGNOSIS — D22.9 MULTIPLE BENIGN NEVI: ICD-10-CM

## 2024-08-07 DIAGNOSIS — L82.1 SEBORRHEIC KERATOSIS: ICD-10-CM

## 2024-08-07 PROCEDURE — 99214 OFFICE O/P EST MOD 30 MIN: CPT | Performed by: DERMATOLOGY

## 2024-08-07 RX ORDER — TRIAMCINOLONE ACETONIDE 0.25 MG/G
CREAM TOPICAL 2 TIMES DAILY
Qty: 30 G | Refills: 3 | Status: SHIPPED | OUTPATIENT
Start: 2024-08-07

## 2024-08-07 ASSESSMENT — PAIN SCALES - GENERAL: PAINLEVEL: NO PAIN (0)

## 2024-08-07 NOTE — LETTER
8/7/2024       RE: Tate Brothers  [address] [place] [address]     Dear Colleague,    Thank you for referring your patient, Tate Brothers, to the [facility] [place] at [facility] [place]. Please see a copy of my visit note below.    [facility] Dermatology Note  Encounter Date: Aug 7, 2024  Office Visit     Dermatology Problem List:  1. Seborrheic dermatitis  - current rx: Ketoconazole cream, shampoo, triamcinolone 0.025% cream   2. AKs.  - 5FU/C to nose/cheeks 8/2023  - Start 5FU/C again to residual areas on face in the Fall/Winter 2024    ____________________________________________    Assessment & Plan:    # Seborrheic dermatitis - chronic, active  - ddx sebopsoriasis   - Eyebrows and nose.   - continue ketoconazole cream or shampoo  - Start triamcinolone 0.025% to affect areas on nose and eyebrows    # Diffuse actinic damage  - face, patient had good response to 5FU/C cream to nose and cheeks last year. Will plan to treat these areas again (forehead, eyebrows, and nose) with 5FU/C and RTC if worsening or no resolution. Okay to delay to fall/winter    # Benign lesions - SKs, cherry angiomas, lentigenes.  - No treatment required    # Multiple benign nevi.   - Monitor for ABCDEs of melanoma   - Continue sun protection - recommend SPF 30 or higher with frequent application   - Return sooner if noticing changing or symptomatic lesions      Procedures Performed: None     Follow-up: 3-4 month(s) in-person, or earlier for new or changing lesions     Staff and Medical Student:   Dina Cat MS4      Staff Physician:  I was present with the medical student who participated in the service and in the documentation of the note. I have verified the history and personally performed the physical exam and medical decision making. I agree with the assessment and plan of care as documented in the note.       Alejandra  MD Raisa    Department of Dermatology  [facility]: Phone: [phone], Fax: [phone]  8/13/2024    ____________________________________________    CC: Derm Problem (Recheck AKs on face. )    HPI:  Mr. Tate Brothers is a(n) 64 year old male who presents today as a return patient for follow up of his seborrheic dermatitis. Patient has been using ketoconazole cream to affected areas on the nose and eyebrows with minimal improvement on the nose, still reports irritation and redness to this area. He has noticed a new bump on the tip of his nose, otherwise feeling well, without additional skin concerns at this time.     Labs Reviewed: None    Physical Exam:  Vitals: There were no vitals taken for this visit.  SKIN: Full body skin exam excluding the genitals was performed including face, scalp, neck, ears, chest, back, bilateral arms, hands, bilateral legs, feet, and buttocks.   - erythematous scaly plaques present on dorsal nose and nasal side wall  - gritty erythematous pink papules on the left eyebrow, left nasal tip, and forehead.   - There are dome shaped bright red papules on the trunk and extremities .   - Multiple regular brown pigmented macules and papules are identified on the trunk and extremities. .   - Scattered brown macules on sun exposed areas.  - Waxy stuck on papules and plaques on trunk and extremities.   - No other lesions of concern on areas examined.     Medications:  Current Outpatient Medications   Medication Sig Dispense Refill     ALPRAZolam (XANAX) 0.5 MG tablet Take 1-2 tablets (0.5-1 mg) by mouth at bedtime Max 35 pills per month 35 tablet 5     calcipotriene (DOVONOX) 0.005 % external cream Mix efudex + calcipotriene equally. Apply BID x 4-10 days. Stop when skin gets red. 60 g 1     fluorouracil (EFUDEX) 5 % external cream Mix equally with calcipotriene cream. Apply BID x 4-10 days. Stop  when skin gets red. 40 g 0     glucosamine-chondroitin 500-400 mg cap [GLUCOSAMINE-CHONDROITIN 500-400 MG CAP] Take 1 capsule by mouth 3 (three) times a day.       ketoconazole (NIZORAL) 2 % external cream Apply topically 2 times daily To face 60 g 11     MEDICATION CANNOT BE REORDERED - PLEASE MANUALLY REORDER AND DISCONTINUE THE OLD ORDER [BIOFLAV,LEMON/VIT BCOMP,C (LIPOFLAVOVIT ORAL)] Take by mouth.       PFIZER-BIONT COVID-19 VAC-KAIDEN 30 MCG/0.3ML injection        rosuvastatin (CRESTOR) 10 MG tablet Take 1 tablet (10 mg) by mouth daily 90 tablet 3     SHINGRIX injection        tamsulosin (FLOMAX) 0.4 MG capsule TAKE 1 CAPSULE BY MOUTH DAILY AFTER DINNER 90 capsule 3     No current facility-administered medications for this visit.      Past Medical History:   Patient Active Problem List   Diagnosis     History of prostate cancer     Insomnia due to medical condition     Controlled substance agreement signed     Chronic pain of left knee     Past Medical History:   Diagnosis Date     Infection due to 2019 novel coronavirus 05/2022     CC No referring provider defined for this encounter. on close of this encounter.      Again, thank you for allowing me to participate in the care of your patient.      Sincerely,    Alejandra Kline MD

## 2024-08-07 NOTE — NURSING NOTE
Dermatology Rooming Note    Tate Brothers's goals for this visit include:   Chief Complaint   Patient presents with    Derm Problem     Recheck AKs on face.      Audrey Comer RN

## 2024-08-07 NOTE — PROGRESS NOTES
[facility] Dermatology Note  Encounter Date: Aug 7, 2024  Office Visit     Dermatology Problem List:  1. Seborrheic dermatitis  - current rx: Ketoconazole cream, shampoo, triamcinolone 0.025% cream   2. AKs.  - 5FU/C to nose/cheeks 8/2023  - Start 5FU/C again to residual areas on face in the Fall/Winter 2024    ____________________________________________    Assessment & Plan:    # Seborrheic dermatitis - chronic, active  - ddx sebopsoriasis   - Eyebrows and nose.   - continue ketoconazole cream or shampoo  - Start triamcinolone 0.025% to affect areas on nose and eyebrows    # Diffuse actinic damage  - face, patient had good response to 5FU/C cream to nose and cheeks last year. Will plan to treat these areas again (forehead, eyebrows, and nose) with 5FU/C and RTC if worsening or no resolution. Okay to delay to fall/winter    # Benign lesions - SKs, cherry angiomas, lentigenes.  - No treatment required    # Multiple benign nevi.   - Monitor for ABCDEs of melanoma   - Continue sun protection - recommend SPF 30 or higher with frequent application   - Return sooner if noticing changing or symptomatic lesions      Procedures Performed: None     Follow-up: 3-4 month(s) in-person, or earlier for new or changing lesions     Staff and Medical Student:   Dina Cat MS4      Staff Physician:  I was present with the medical student who participated in the service and in the documentation of the note. I have verified the history and personally performed the physical exam and medical decision making. I agree with the assessment and plan of care as documented in the note.       Alejandra Kline MD    Department of Dermatology  [facility]: Phone: [phone], Fax: [phone]  8/13/2024    ____________________________________________    CC: Derm Problem (Recheck AKs on face. )    HPI:  Mr. Tate Brothers is a(n)  64 year old male who presents today as a return patient for follow up of his seborrheic dermatitis. Patient has been using ketoconazole cream to affected areas on the nose and eyebrows with minimal improvement on the nose, still reports irritation and redness to this area. He has noticed a new bump on the tip of his nose, otherwise feeling well, without additional skin concerns at this time.     Labs Reviewed: None    Physical Exam:  Vitals: There were no vitals taken for this visit.  SKIN: Full body skin exam excluding the genitals was performed including face, scalp, neck, ears, chest, back, bilateral arms, hands, bilateral legs, feet, and buttocks.   - erythematous scaly plaques present on dorsal nose and nasal side wall  - gritty erythematous pink papules on the left eyebrow, left nasal tip, and forehead.   - There are dome shaped bright red papules on the trunk and extremities .   - Multiple regular brown pigmented macules and papules are identified on the trunk and extremities. .   - Scattered brown macules on sun exposed areas.  - Waxy stuck on papules and plaques on trunk and extremities.   - No other lesions of concern on areas examined.     Medications:  Current Outpatient Medications   Medication Sig Dispense Refill    ALPRAZolam (XANAX) 0.5 MG tablet Take 1-2 tablets (0.5-1 mg) by mouth at bedtime Max 35 pills per month 35 tablet 5    calcipotriene (DOVONOX) 0.005 % external cream Mix efudex + calcipotriene equally. Apply BID x 4-10 days. Stop when skin gets red. 60 g 1    fluorouracil (EFUDEX) 5 % external cream Mix equally with calcipotriene cream. Apply BID x 4-10 days. Stop when skin gets red. 40 g 0    glucosamine-chondroitin 500-400 mg cap [GLUCOSAMINE-CHONDROITIN 500-400 MG CAP] Take 1 capsule by mouth 3 (three) times a day.      ketoconazole (NIZORAL) 2 % external cream Apply topically 2 times daily To face 60 g 11    MEDICATION CANNOT BE REORDERED - PLEASE MANUALLY REORDER AND DISCONTINUE THE  OLD ORDER [BIOFLAV,LEMON/VIT BCOMP,C (LIPOFLAVOVIT ORAL)] Take by mouth.      PFIZER-BIONT COVID-19 VAC-KAIDEN 30 MCG/0.3ML injection       rosuvastatin (CRESTOR) 10 MG tablet Take 1 tablet (10 mg) by mouth daily 90 tablet 3    SHINGRIX injection       tamsulosin (FLOMAX) 0.4 MG capsule TAKE 1 CAPSULE BY MOUTH DAILY AFTER DINNER 90 capsule 3     No current facility-administered medications for this visit.      Past Medical History:   Patient Active Problem List   Diagnosis    History of prostate cancer    Insomnia due to medical condition    Controlled substance agreement signed    Chronic pain of left knee     Past Medical History:   Diagnosis Date    Infection due to 2019 novel coronavirus 05/2022     CC No referring provider defined for this encounter. on close of this encounter.

## 2024-10-24 ENCOUNTER — MYC REFILL (OUTPATIENT)
Dept: FAMILY MEDICINE | Facility: CLINIC | Age: 64
End: 2024-10-24
Payer: COMMERCIAL

## 2024-10-24 DIAGNOSIS — H93.A3 PULSATILE TINNITUS OF BOTH EARS: ICD-10-CM

## 2024-10-24 DIAGNOSIS — F51.01 PRIMARY INSOMNIA: ICD-10-CM

## 2024-10-25 RX ORDER — ALPRAZOLAM 0.5 MG
.5-1 TABLET ORAL AT BEDTIME
Qty: 35 TABLET | Refills: 5 | Status: SHIPPED | OUTPATIENT
Start: 2024-11-11

## 2024-10-25 NOTE — TELEPHONE ENCOUNTER
4/25/24    Diagnosis: insomnia  Medication : Alprazolam 0.5 mg nightly  - half pill extra 10 nights per month  Quantity per month: insomnia  Number of refills before follow up visit: 12    10/12/2024 04/25/2024 1 Alprazolam 0.5 Mg Tablet 35.00 30 Ma Win [id number] Gra (7570) 5/5 1.17 [place]   09/10/2024 04/25/2024 1 Alprazolam 0.5 Mg Tablet 35.00 30 Ma Win [id number] Gra (7545) 4/5 1.17 [place]   08/08/2024 04/25/2024 1 Alprazolam 0.5 Mg Tablet 35.00 30 Ma Win [id number] Gra (7538) 3/5 1.17 [place]   07/08/2024 04/25/2024 1 Alprazolam 0.5 Mg Tablet 35.00 30 Ma Win [id number] Gra (7523) 2/5 1.17 [place]   06/07/2024 04/25/2024 1 Alprazolam 0.5 Mg Tablet 35.00 30 Ma Win [id number] Gra (7570) 1/5 1.17 [place]   04/25/2024 04/25/2024 1 Alprazolam 0.5 Mg Tablet 35.00 18 Ma Win [id number] Gra (7570) 0/5 1.94 [place]

## 2024-12-05 ENCOUNTER — VIRTUAL VISIT (OUTPATIENT)
Dept: FAMILY MEDICINE | Facility: CLINIC | Age: 64
End: 2024-12-05
Payer: COMMERCIAL

## 2024-12-05 DIAGNOSIS — R13.19 ESOPHAGEAL DYSPHAGIA: Primary | ICD-10-CM

## 2024-12-05 DIAGNOSIS — Z12.11 SPECIAL SCREENING FOR MALIGNANT NEOPLASMS, COLON: ICD-10-CM

## 2024-12-05 PROCEDURE — 99442 PR PHYSICIAN TELEPHONE EVALUATION 11-20 MIN: CPT | Mod: 93 | Performed by: FAMILY MEDICINE

## 2024-12-05 NOTE — PROGRESS NOTES
Tate is a 64 year old who is being evaluated via a billable telephone visit.    What phone number would you like to be contacted at? [phone]   How would you like to obtain your AVS? Karly  Originating Location (pt. Location): Home  {PROVIDER LOCATION On-site should be selected for visits conducted from your clinic location or adjoining [facility] hospital, academic office, or other nearby [facility] building. Off-site should be selected for all other provider locations, including home:[address]}  Distant Location (provider location):  Off-site    Assessment & Plan     Esophageal dysphagia  Episodic and based on what and how he eats.  Mac & cheese and pizza are fine for instance.  He specifically notices difficulty when he is eating a sandwich as he is driving in his van.    Discussed for now if he has to eat in his van while driving e small bites and use a water bottle with escort function to drink.  Even better attempting to eat before or after he has to drive anywhere    Special screening for malignant neoplasms, colon  - Colonoscopy Screening  Referral    Return in about 4 months (around 4/5/2025) for Routine preventive, or earlier as needed.      Subjective   Tate is a 64 year old, presenting for the following health issues:  Dysphagia (Pt c/o difficulty swallowing for last 5-12 years. Pt wondering if he needs an endoscopy)        12/5/2024     5:00 PM   Additional Questions   Roomed by Ketan CRUZ RN     History of Present Illness       Reason for visit:  Dysphagia  Symptom onset:  More than a month  Symptoms include:  Felling like foods get stuck in bottom of throat  Symptom intensity:  Mild  Symptom progression:  Staying the same  Had these symptoms before:  Yes  Has tried/received treatment for these symptoms:  No  What makes it worse:  Nothing  What makes it better:  Drinking water   He is taking medications regularly.     No touble swallowing. 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 fayg    7-8-9 years agp basket of fies    First colonoscopy and endoscopy too  At age 48  - that was fine  O reflux  Habits for now    {SUPERLIST (Optional):072834}  {additonal problems for provider to add (Optional):155032}    {ROS Picklists (Optional):582279}      Objective    Vitals - Patient Reported  Pain Score: No Pain (0)        Physical Exam   General: Alert and no distress //Respiratory: No audible wheeze, cough, or shortness of breath // Psychiatric:  Appropriate affect, tone, and pace of words      {Diagnostic Test Results (Optional):453943}      Phone call duration: *** minutes  Signed Electronically by: Sonali Zacarias MD  {Email feedback regarding this note to [email or web address]   :[id number]}

## 2024-12-17 NOTE — PROGRESS NOTES
[facility] Dermatology Note  Encounter Date: Dec 18, 2024  Office Visit     Dermatology Problem List:  1. 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 AKs.  - 5FU/C to nose/cheeks 8/2023, fall/winter 2024    # Fhx of NMSC in both brothers  ____________________________________________    Assessment & Plan:    # Psoriasis (sebopsoriasis, ddx seborrheic dermatitis) - chronic, active, improved  Reassuringly improved with TMC daily but would like to find non-steroid alternative.  - start roflumilast cream daily  - continue ketoconazole cream and/or ketoconazole shampoo  - continue triamcinolone 0.025% to affect areas on nose and eyebrows prn     # Diffuse actinic damage  - none present today, wondering if prior exam with concern for AKs were actually psoriasis as above  - L eyebrow with some grittiness but favor consistent with above, focus TMC here to ensure resolves, can recheck next visit      Procedures Performed: None     Follow-up: 6 month(s) in-person, or earlier for new or changing lesions     Staff and Scribe:     Scribe Disclosure:   I, Caitlin Hughes, am serving as a scribe; to document services personally performed by Alejandra Kline MD -based on data collection and the provider's statements to me.     Provider Disclosure:   The documentation recorded by the scribe accurately reflects the services I personally performed and the decisions made by me.    Alejandra Kline MD    Department of Dermatology  [facility]: Phone: [phone], Fax: [phone]  12/23/2024       ____________________________________________    CC: Derm Problem (Tate is here to recheck Aks on forehead, scalp and nose.)    HPI:  Mr. Tate Brothers is a(n) 64 year old male who presents today as a return  patient for AK recheck.     The patient reports that his skin has been doing well. TMC has been working well on his forehead. He uses this almost daily.   He used 5 FUC for 3 days and that made his nose very red  He has not been using ketoconazole since starting TMC.       Labs Reviewed: None    Physical exam:  Vitals: There were no vitals taken for this visit.  GEN: This is a well developed, well-nourished male in no acute distress, in a pleasant mood.    SKIN: Focused examination of the below was performed.  - some scaling along forehead, upper eyelids, paranasal cheek improved   - 2 slightly more gritty pink papules along eye brown   - No other lesions of concern on areas examined.       Medications:  Current Outpatient Medications   Medication Sig Dispense Refill    ALPRAZolam (XANAX) 0.5 MG tablet Take 1-2 tablets (0.5-1 mg) by mouth at bedtime. Max 35 pills per month 35 tablet 5    glucosamine-chondroitin 500-400 mg cap [GLUCOSAMINE-CHONDROITIN 500-400 MG CAP] Take 1 capsule by mouth 3 (three) times a day.      MEDICATION CANNOT BE REORDERED - PLEASE MANUALLY REORDER AND DISCONTINUE THE OLD ORDER [BIOFLAV,LEMON/VIT BCOMP,C (LIPOFLAVOVIT ORAL)] Take by mouth.      PFIZER-CloudBilt COVID-19 VAC-KAIDEN 30 MCG/0.3ML injection       rosuvastatin (CRESTOR) 10 MG tablet Take 1 tablet (10 mg) by mouth daily 90 tablet 3    SHINGRIX injection       tamsulosin (FLOMAX) 0.4 MG capsule TAKE 1 CAPSULE BY MOUTH DAILY AFTER DINNER 90 capsule 3    triamcinolone (KENALOG) 0.025 % cream Apply topically 2 times daily To nose and eyebrows when not responding to ketoconazole cream 30 g 3    calcipotriene (DOVONOX) 0.005 % external cream Mix efudex + calcipotriene equally. Apply BID x 4-10 days. Stop when skin gets red. (Patient not taking: Reported on 12/18/2024) 60 g 1    fluorouracil (EFUDEX) 5 % external cream Mix equally with calcipotriene cream. Apply BID x 4-10 days. Stop when skin gets red. (Patient not taking: Reported on  12/18/2024) 40 g 0    ketoconazole (NIZORAL) 2 % external cream Apply topically 2 times daily To face (Patient not taking: Reported on 12/18/2024) 60 g 11     No current facility-administered medications for this visit.      Past Medical History:   Patient Active Problem List   Diagnosis    History of prostate cancer    Insomnia due to medical condition    Controlled substance agreement signed    Chronic pain of left knee     Past Medical History:   Diagnosis Date    Infection due to 2019 novel coronavirus 05/2022     CC No referring provider defined for this encounter. on close of this encounter.

## 2024-12-18 ENCOUNTER — OFFICE VISIT (OUTPATIENT)
Dept: DERMATOLOGY | Facility: CLINIC | Age: 64
End: 2024-12-18
Payer: COMMERCIAL

## 2024-12-18 DIAGNOSIS — L40.9 PSORIASIS: Primary | ICD-10-CM

## 2024-12-18 DIAGNOSIS — L21.9 SEBORRHEIC DERMATITIS: ICD-10-CM

## 2024-12-18 ASSESSMENT — PAIN SCALES - GENERAL: PAINLEVEL_OUTOF10: NO PAIN (0)

## 2024-12-18 NOTE — NURSING NOTE
Dermatology Rooming Note    Tate Brothers's goals for this visit include:   Chief Complaint   Patient presents with    Derm Problem     Tate is here to recheck Aks on forehead, scalp and nose.     Leonardo Hu, EMT

## 2024-12-18 NOTE — LETTER
12/18/2024       RE: Tate Brothers  [address] [place] [address]     Dear Colleague,    Thank you for referring your patient, Tate Brothers, to the [facility] [place] at [facility]. Please see a copy of my visit note below.    [facility] Dermatology Note  Encounter Date: Dec 18, 2024  Office Visit     Dermatology Problem List:  1. 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 AKs.  - 5FU/C to nose/cheeks 8/2023, fall/winter 2024    # Fhx of NMSC in both brothers  ____________________________________________    Assessment & Plan:    # Psoriasis (sebopsoriasis, ddx seborrheic dermatitis) - chronic, active, improved  Reassuringly improved with TMC daily but would like to find non-steroid alternative.  - start roflumilast cream daily  - continue ketoconazole cream and/or ketoconazole shampoo  - continue triamcinolone 0.025% to affect areas on nose and eyebrows prn     # Diffuse actinic damage  - none present today, wondering if prior exam with concern for AKs were actually psoriasis as above  - L eyebrow with some grittiness but favor consistent with above, focus TMC here to ensure resolves, can recheck next visit      Procedures Performed: None     Follow-up: 6 month(s) in-person, or earlier for new or changing lesions     Staff and Scribe:     Scribe Disclosure:   I, Caitlin Hughes, am serving as a scribe; to document services personally performed by Alejandra Kline MD -based on data collection and the provider's statements to me.     Provider Disclosure:   The documentation recorded by the scribe accurately reflects the services I personally performed and the decisions made by me.    Alejandra Kline MD    Department of Dermatology  [facility]  [facility]: Phone: [phone], Fax: [phone]  12/23/2024       ____________________________________________    CC: Derm Problem (Tate is here to recheck Aks on forehead, scalp and nose.)    HPI:  Mr. Tate Brothers is a(n) 64 year old male who presents today as a return patient for AK recheck.     The patient reports that his skin has been doing well. TMC has been working well on his forehead. He uses this almost daily.   He used 5 FUC for 3 days and that made his nose very red  He has not been using ketoconazole since starting TMC.       Labs Reviewed: None    Physical exam:  Vitals: There were no vitals taken for this visit.  GEN: This is a well developed, well-nourished male in no acute distress, in a pleasant mood.    SKIN: Focused examination of the below was performed.  - some scaling along forehead, upper eyelids, paranasal cheek improved   - 2 slightly more gritty pink papules along eye brown   - No other lesions of concern on areas examined.       Medications:  Current Outpatient Medications   Medication Sig Dispense Refill     ALPRAZolam (XANAX) 0.5 MG tablet Take 1-2 tablets (0.5-1 mg) by mouth at bedtime. Max 35 pills per month 35 tablet 5     glucosamine-chondroitin 500-400 mg cap [GLUCOSAMINE-CHONDROITIN 500-400 MG CAP] Take 1 capsule by mouth 3 (three) times a day.       MEDICATION CANNOT BE REORDERED - PLEASE MANUALLY REORDER AND DISCONTINUE THE OLD ORDER [BIOFLAV,LEMON/VIT BCOMP,C (LIPOFLAVOVIT ORAL)] Take by mouth.       PFIZER-BIONT COVID-19 VAC-KAIDEN 30 MCG/0.3ML injection        rosuvastatin (CRESTOR) 10 MG tablet Take 1 tablet (10 mg) by mouth daily 90 tablet 3     SHINGRIX injection        tamsulosin (FLOMAX) 0.4 MG capsule TAKE 1 CAPSULE BY MOUTH DAILY AFTER DINNER 90 capsule 3     triamcinolone (KENALOG) 0.025 % cream Apply topically 2 times daily To nose and eyebrows when not responding to ketoconazole cream 30 g 3      calcipotriene (DOVONOX) 0.005 % external cream Mix efudex + calcipotriene equally. Apply BID x 4-10 days. Stop when skin gets red. (Patient not taking: Reported on 12/18/2024) 60 g 1     fluorouracil (EFUDEX) 5 % external cream Mix equally with calcipotriene cream. Apply BID x 4-10 days. Stop when skin gets red. (Patient not taking: Reported on 12/18/2024) 40 g 0     ketoconazole (NIZORAL) 2 % external cream Apply topically 2 times daily To face (Patient not taking: Reported on 12/18/2024) 60 g 11     No current facility-administered medications for this visit.      Past Medical History:   Patient Active Problem List   Diagnosis     History of prostate cancer     Insomnia due to medical condition     Controlled substance agreement signed     Chronic pain of left knee     Past Medical History:   Diagnosis Date     Infection due to 2019 novel coronavirus 05/2022     CC No referring provider defined for this encounter. on close of this encounter.      Again, thank you for allowing me to participate in the care of your patient.      Sincerely,    Alejandra Kline MD

## 2025-01-23 NOTE — TELEPHONE ENCOUNTER
Refill request refused - patient should have refills on file.    Tamsulosin 0.4mg was refilled on 12/30/2021. Qty-90, ref-1        Milena Ha RN  04/04/22 9:14 AM  [facility] Nurse Advisor  
[Time Spent: ___ minutes] : I have spent [unfilled] minutes of time on the encounter which excludes teaching and separately reported services.

## 2025-02-11 ENCOUNTER — TRANSFERRED RECORDS (OUTPATIENT)
Dept: HEALTH INFORMATION MANAGEMENT | Facility: CLINIC | Age: 65
End: 2025-02-11
Payer: COMMERCIAL

## 2025-02-19 ENCOUNTER — PATIENT OUTREACH (OUTPATIENT)
Dept: GASTROENTEROLOGY | Facility: CLINIC | Age: 65
End: 2025-02-19
Payer: COMMERCIAL

## 2025-02-19 PROBLEM — D12.6 ADENOMATOUS COLON POLYP: Status: ACTIVE | Noted: 2025-02-19

## 2025-05-02 ENCOUNTER — OFFICE VISIT (OUTPATIENT)
Dept: FAMILY MEDICINE | Facility: CLINIC | Age: 65
End: 2025-05-02
Attending: FAMILY MEDICINE
Payer: COMMERCIAL

## 2025-05-02 VITALS
TEMPERATURE: 97.5 F | RESPIRATION RATE: 11 BRPM | HEART RATE: 57 BPM | BODY MASS INDEX: 22.76 KG/M2 | WEIGHT: 159 LBS | SYSTOLIC BLOOD PRESSURE: 113 MMHG | OXYGEN SATURATION: 98 % | DIASTOLIC BLOOD PRESSURE: 69 MMHG | HEIGHT: 70 IN

## 2025-05-02 DIAGNOSIS — Z12.5 SCREENING FOR PROSTATE CANCER: ICD-10-CM

## 2025-05-02 DIAGNOSIS — F51.01 PRIMARY INSOMNIA: ICD-10-CM

## 2025-05-02 DIAGNOSIS — Z23 NEED FOR VACCINATION: ICD-10-CM

## 2025-05-02 DIAGNOSIS — R93.1 ELEVATED CORONARY ARTERY CALCIUM SCORE: ICD-10-CM

## 2025-05-02 DIAGNOSIS — Z00.00 ENCOUNTER FOR MEDICARE ANNUAL WELLNESS EXAM: Primary | ICD-10-CM

## 2025-05-02 DIAGNOSIS — Z85.46 HISTORY OF PROSTATE CANCER: ICD-10-CM

## 2025-05-02 DIAGNOSIS — Z79.899 CONTROLLED SUBSTANCE AGREEMENT SIGNED: ICD-10-CM

## 2025-05-02 DIAGNOSIS — H93.A3 PULSATILE TINNITUS OF BOTH EARS: ICD-10-CM

## 2025-05-02 DIAGNOSIS — E78.5 HYPERLIPIDEMIA LDL GOAL <70: ICD-10-CM

## 2025-05-02 LAB
AMPHETAMINES UR QL SCN: ABNORMAL
BARBITURATES UR QL SCN: ABNORMAL
BENZODIAZ UR QL SCN: ABNORMAL
BZE UR QL SCN: ABNORMAL
CANNABINOIDS UR QL SCN: ABNORMAL
CHOLEST SERPL-MCNC: 150 MG/DL
FASTING STATUS PATIENT QL REPORTED: NO
FENTANYL UR QL: ABNORMAL
HDLC SERPL-MCNC: 45 MG/DL
LDLC SERPL CALC-MCNC: 84 MG/DL
NONHDLC SERPL-MCNC: 105 MG/DL
OPIATES UR QL SCN: ABNORMAL
PCP QUAL URINE (ROCHE): ABNORMAL
PSA SERPL DL<=0.01 NG/ML-MCNC: 0.35 NG/ML (ref 0–4.5)
TRIGL SERPL-MCNC: 104 MG/DL

## 2025-05-02 PROCEDURE — 36415 COLL VENOUS BLD VENIPUNCTURE: CPT | Performed by: FAMILY MEDICINE

## 2025-05-02 PROCEDURE — 1126F AMNT PAIN NOTED NONE PRSNT: CPT | Performed by: FAMILY MEDICINE

## 2025-05-02 PROCEDURE — 99214 OFFICE O/P EST MOD 30 MIN: CPT | Mod: 25 | Performed by: FAMILY MEDICINE

## 2025-05-02 PROCEDURE — G0439 PPPS, SUBSEQ VISIT: HCPCS | Performed by: FAMILY MEDICINE

## 2025-05-02 PROCEDURE — G2211 COMPLEX E/M VISIT ADD ON: HCPCS | Performed by: FAMILY MEDICINE

## 2025-05-02 PROCEDURE — 3074F SYST BP LT 130 MM HG: CPT | Performed by: FAMILY MEDICINE

## 2025-05-02 PROCEDURE — 80307 DRUG TEST PRSMV CHEM ANLYZR: CPT | Performed by: FAMILY MEDICINE

## 2025-05-02 PROCEDURE — 3078F DIAST BP <80 MM HG: CPT | Performed by: FAMILY MEDICINE

## 2025-05-02 PROCEDURE — G0103 PSA SCREENING: HCPCS | Performed by: FAMILY MEDICINE

## 2025-05-02 PROCEDURE — 80061 LIPID PANEL: CPT | Performed by: FAMILY MEDICINE

## 2025-05-02 RX ORDER — ROSUVASTATIN CALCIUM 10 MG/1
10 TABLET, COATED ORAL DAILY
Qty: 90 TABLET | Refills: 3 | Status: SHIPPED | OUTPATIENT
Start: 2025-05-02

## 2025-05-02 RX ORDER — ALPRAZOLAM 0.5 MG
.5-1 TABLET ORAL AT BEDTIME
Qty: 35 TABLET | Refills: 5 | Status: SHIPPED | OUTPATIENT
Start: 2025-05-02

## 2025-05-02 RX ORDER — TAMSULOSIN HYDROCHLORIDE 0.4 MG/1
CAPSULE ORAL
Qty: 90 CAPSULE | Refills: 3 | Status: SHIPPED | OUTPATIENT
Start: 2025-05-02

## 2025-05-02 SDOH — HEALTH STABILITY: PHYSICAL HEALTH: ON AVERAGE, HOW MANY DAYS PER WEEK DO YOU ENGAGE IN MODERATE TO STRENUOUS EXERCISE (LIKE A BRISK WALK)?: 5 DAYS

## 2025-05-02 ASSESSMENT — PAIN SCALES - GENERAL: PAINLEVEL_OUTOF10: NO PAIN (0)

## 2025-05-02 ASSESSMENT — SOCIAL DETERMINANTS OF HEALTH (SDOH): HOW OFTEN DO YOU GET TOGETHER WITH FRIENDS OR RELATIVES?: MORE THAN THREE TIMES A WEEK

## 2025-05-02 NOTE — PATIENT INSTRUCTIONS
Patient Education   Preventive Care Advice   This is general advice given by our system to help you stay healthy. However, your care team may have specific advice just for you. Please talk to your care team about your preventive care needs.  Nutrition  Eat 5 or more servings of fruits and vegetables each day.  Try wheat bread, brown rice and whole grain pasta (instead of white bread, rice, and pasta).  Get enough calcium and vitamin D. Check the label on foods and aim for 100% of the RDA (recommended daily allowance).  Lifestyle  Exercise at least 150 minutes each week  (30 minutes a day, 5 days a week).  Do muscle strengthening activities 2 days a week. These help control your weight and prevent disease.  No smoking.  Wear sunscreen to prevent skin cancer.  Have a dental exam and cleaning every 6 months.  Yearly exams  See your health care team every year to talk about:  Any changes in your health.  Any medicines your care team has prescribed.  Preventive care, family planning, and ways to prevent chronic diseases.  Shots (vaccines)   HPV shots (up to age 26), if you've never had them before.  Hepatitis B shots (up to age 59), if you've never had them before.  COVID-19 shot: Get this shot when it's due.  Flu shot: Get a flu shot every year.  Tetanus shot: Get a tetanus shot every 10 years.  Pneumococcal, hepatitis A, and RSV shots: Ask your care team if you need these based on your risk.  Shingles shot (for age 50 and up)  General health tests  Diabetes screening:  Starting at age 35, Get screened for diabetes at least every 3 years.  If you are younger than age 35, ask your care team if you should be screened for diabetes.  Cholesterol test: At age 39, start having a cholesterol test every 5 years, or more often if advised.  Bone density scan (DEXA): At age 50, ask your care team if you should have this scan for osteoporosis (brittle bones).  Hepatitis C: Get tested at least once in your life.  STIs (sexually  transmitted infections)  Before age 24: Ask your care team if you should be screened for STIs.  After age 24: Get screened for STIs if you're at risk. You are at risk for STIs (including HIV) if:  You are sexually active with more than one person.  You don't use condoms every time.  You or a partner was diagnosed with a sexually transmitted infection.  If you are at risk for HIV, ask about PrEP medicine to prevent HIV.  Get tested for HIV at least once in your life, whether you are at risk for HIV or not.  Cancer screening tests  Cervical cancer screening: If you have a cervix, begin getting regular cervical cancer screening tests starting at age 21.  Breast cancer scan (mammogram): If you've ever had breasts, begin having regular mammograms starting at age 40. This is a scan to check for breast cancer.  Colon cancer screening: It is important to start screening for colon cancer at age 45.  Have a colonoscopy test every 10 years (or more often if you're at risk) Or, ask your provider about stool tests like a FIT test every year or Cologuard test every 3 years.  To learn more about your testing options, visit:   .  For help making a decision, visit:   https://bit.ly/za49199.  Prostate cancer screening test: If you have a prostate, ask your care team if a prostate cancer screening test (PSA) at age 55 is right for you.  Lung cancer screening: If you are a current or former smoker ages 50 to 80, ask your care team if ongoing lung cancer screenings are right for you.  For informational purposes only. Not to replace the advice of your health care provider. Copyright   2023 [facility] [employer]. All rights reserved. Clinically reviewed by the [facility]. [employer] 682120 - REV 01/24.  Hearing Loss: Care Instructions  Overview     Hearing loss is a sudden or slow decrease in how well you hear. It can range from slight to profound. Permanent hearing loss can occur with aging. It also can  happen when you are exposed long-term to loud noise. Examples include listening to loud music, riding motorcycles, or being around other loud machines.  Hearing loss can affect your work and home life. It can make you feel lonely or depressed. You may feel that you have lost your independence. But hearing aids and other devices can help you hear better and feel connected to others.  Follow-up care is a key part of your treatment and safety. Be sure to make and go to all appointments, and call your doctor if you are having problems. It's also a good idea to know your test results and keep a list of the medicines you take.  How can you care for yourself at home?  Avoid loud noises whenever possible. This helps keep your hearing from getting worse.  Always wear hearing protection around loud noises.  Wear a hearing aid as directed.  A professional can help you pick a hearing aid that will work best for you.  You can also get hearing aids over the counter for mild to moderate hearing loss.  Have hearing tests as your doctor suggests. They can show whether your hearing has changed. Your hearing aid may need to be adjusted.  Use other devices as needed. These may include:  Telephone amplifiers and hearing aids that can connect to a television, stereo, radio, or microphone.  Devices that use lights or vibrations. These alert you to the doorbell, a ringing telephone, or a baby monitor.  Television closed-captioning. This shows the words at the bottom of the screen. Most new TVs can do this.  TTY (text telephone). This lets you type messages back and forth on the telephone instead of talking or listening. These devices are also called TDD. When messages are typed on the keyboard, they are sent over the phone line to a receiving TTY. The message is shown on a monitor.  Use text messaging, social media, and email if it is hard for you to communicate by telephone.  Try to learn a listening technique called speechreading. It is  "not lipreading. You pay attention to people's gestures, expressions, posture, and tone of voice. These clues can help you understand what a person is saying. Face the person you are talking to, and have them face you. Make sure the lighting is good. You need to see the other person's face clearly.  Think about counseling if you need help to adjust to your hearing loss.  When should you call for help?  Watch closely for changes in your health, and be sure to contact your doctor if:    You think your hearing is getting worse.     You have new symptoms, such as dizziness or nausea.   Where can you learn more?  Go to https://www.[employer].net/patiented  Enter R798 in the search box to learn more about \"Hearing Loss: Care Instructions.\"  Current as of: October 27, 2024  Content Version: 14.4    2599-6230 [employer].   Care instructions adapted under license by your healthcare professional. If you have questions about a medical condition or this instruction, always ask your healthcare professional. [employer] disclaims any warranty or liability for your use of this information.       "

## 2025-05-02 NOTE — LETTER
[facility]  05/02/25  Patient: Tate Brothers  YOB: 1960  Medical Record Number: [id number]                                                                                  Non-Opioid Controlled Substance Agreement    This is an agreement between you and your provider regarding safe and appropriate use of controlled substances prescribed by your care team. Controlled substances are?medicines that can cause physical and mental dependence (abuse).     There are strict laws about having and using these medicines. We here at [facility] are  committed to working with you in your efforts to get better. To support you in this work, we'll help you schedule regular office appointments for medicine refills. If we must cancel or change your appointment for any reason, we'll make sure you have enough medicine to last until your next appointment.     As a Provider, I will:   Listen carefully to your concerns while treating you with respect.   Recommend a treatment plan that I believe is in your best interest and may involve therapies other than medicine.    Talk with you often about the possible benefits and the risk of harm of any medicine that we prescribe for you.  Assess the safety of this medicine and check how well it works.    Provide a plan on how to taper (discontinue or go off) using this medicine if the decision is made to stop its use.      ::  As a Patient, I understand controlled substances:     Are prescribed by my care provider to help me function or work and manage my condition(s).?  Are strong medicines and can cause serious side effects.     Need to be taken exactly as prescribed.?Combining controlled substances with certain medicines or chemicals (such as illegal drugs, alcohol, sedatives, sleeping pills, and benzodiazepines) can be dangerous or even fatal.? If I stop taking my medicines suddenly, I may have severe withdrawal symptoms.     Diagnosis:  insomnia  Medication : Alprazolam 0.5 mg nightly  - half pill extra 10 nights per month  Quantity per month: insomnia  Number of refills before follow up visit: 12    The risks, benefits, and side effects of these medicine(s) were explained to me. I agree that:    I will take part in other treatments as advised by my care team. This may be psychiatry or counseling, physical therapy, behavioral therapy, group treatment or a referral to specialist.  I will keep all my appointments and understand this is part of the monitoring of controlled substances.?My care team may require an office visit for EVERY controlled substance refill. If I miss appointments or don t follow instructions, my care team may stop my medicine  I will take my medicines as prescribed. I will not change the dose or schedule unless my care team tells me to. There will be no refills if I run out early.      I may be asked to come to the clinic and complete a urine drug test or complete a pill count. If I don t give a urine sample or participate in a pill count, the care team may stop my medicine.  I will only receive controlled substance prescriptions from this clinic. If I am treated by another provider, I will tell them that I am taking controlled substances and that I have a treatment agreement with this provider. I will inform my [facility] care team within one business day if I am given a prescription for any controlled substance by another healthcare provider. My [facility] care team can contact other providers and pharmacists about my use of any medicines.  It is up to me to make sure that I don't run out of my medicines on weekends or holidays.?If my care team is willing to refill my prescription without a visit, I must request refills only during office hours. Refills may take up to 3 business days to process. I will use one pharmacy to fill all my controlled substance prescriptions. I will notify the clinic about any changes  to my insurance or medicine availability.  I am responsible for my prescriptions. If the medicine/prescription is lost, stolen or destroyed, it will not be replaced.?I also agree not to share controlled substance medicines with anyone.   I am aware I should not use any illegal or recreational drugs. I agree not to drink alcohol unless my care team says I can.   If I enroll in the [place] [facility] program, I will tell my care team before my next refill.  I will tell my care team right away if I become pregnant, have a new medical problem treated outside of my regular clinic, or have a change in my medicines.   I understand that this medicine can affect my thinking, judgment and reaction time.? Alcohol and drugs affect the brain and body, which can affect the safety of my driving. Being under the influence of alcohol or drugs can affect my decision-making, behaviors, personal safety and the safety of others. Driving while impaired (DWI) can occur if a person is driving, operating or in physical control of a car, motorcycle, boat, snowmobile, ATV, motorbike, off-road vehicle or any other motor vehicle ([place] Statute 169A.20). I understand the risk if I choose to drive or operate any vehicle or machinery.    I understand that if I do not follow any of the conditions above, my prescriptions or treatment may be stopped or changed.   I agree that my provider, clinic care team and pharmacy may work with any city, state or federal law enforcement agency that investigates the misuse, sale or other diversion of my controlled medicine. I will allow my provider to discuss my care with, or share a copy of, this agreement with any other treating provider, pharmacy or emergency room where I receive care.     I have read this agreement and have asked questions about anything I did not understand.    ________________________________________________________  Patient Signature - Tate Brothers     ___________________                    Date     ________________________________________________________  Provider Signature - Sonali M. [place], MD       ___________________                   Date     ________________________________________________________  Witness Signature (required if provider not present while patient signing)          ___________________                   Date

## 2025-05-02 NOTE — PROGRESS NOTES
Preventive Care Visit  [facility]  Sonali Zacarias MD, Family Medicine  May 2, 2025      Assessment & Plan     Encounter for Medicare annual wellness exam    Primary insomnia  Pulsatile tinnitus of both ears  Controlled on  - ALPRAZolam (XANAX) 0.5 MG tablet  Dispense: 35 tablet; Refill: 5    Controlled substance agreement signed  For medication above  - Urine Drug Screen    Elevated coronary artery calcium score  Hyperlipidemia LDL goal <70  - rosuvastatin (CRESTOR) 10 MG tablet  Dispense: 90 tablet; Refill: 3  - Lipid panel reflex to direct LDL Non-fasting    History of prostate cancer  Screening for prostate cancer  - tamsulosin (FLOMAX) 0.4 MG capsule  Dispense: 90 capsule; Refill: 3  - PSA, screen    Need for vaccination  To get at pharmacy  - RSV vaccine, bivalent, ABRYSVO, injection  Dispense: 0.5 mL; Refill: 0      Return in about 1 year (around 5/2/2026) for medicare annual wellness, or earlier as needed.         Counseling  Appropriate preventive services were addressed with this patient via screening, questionnaire, or discussion as appropriate for , nutrition,.    No counseling necessary for: fall prevention physical activity, Tobacco-use cessation, social engagement, weight loss and cognition      Checklist reviewing preventive services available has been given to the patient.  Reviewed patient's diet, addressing concerns and/or questions.   The patient was provided with written information regarding signs of hearing loss.           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  He used to do marathons, however in the last several years he cannot seem to avoid having his left calf get painful, so now walks the same route he used to run.  It just takes longer.  He ran from age 9  into his 60s and understands that there is a limit to how much any human can do     He now works part-time as a  for a .  This is enjoyable.  He and his wife also have an air B&B in their house and the dog    They have 2 children, daughter who is  to a woman and lives in [place] and his son who lives a couple blocks away and has syndrome.  He quit his job at the [employer] and is been working on interviews with a .  Still try to figure out how to work.  They do not expect to have grandchildren    Insomnia Tate continues to appreciate his half milligram of alprazolam nightly    Advance Care Planning  Document on file is a Health Care Directive or POLST.        5/2/2025   General Health   How would you rate your overall physical health? Excellent   Feel stress (tense, anxious, or unable to sleep) Only a little   (!) STRESS CONCERN      5/2/2025   Nutrition   Diet: Regular (no restrictions)         5/2/2025   Exercise   Days per week of moderate/strenuous exercise 5 days         5/2/2025   Social Factors   Frequency of gathering with friends or relatives More than three times a week   Worry food won't last until get money to buy more No   Food not last or not have enough money for food? No   Do you have housing? (Housing is defined as stable permanent housing and does not include staying outside in a car, in a tent, in an abandoned building, in an overnight shelter, or couch-surfing.) Yes   Are you worried about losing your housing? No   Lack of transportation? No   Unable to get utilities (heat,electricity)? No         5/2/2025   Fall Risk   Fallen 2 or more times in the past year? No   Trouble with walking or balance? No    Dos on exertion is really careful        5/2/2025   Activities of Daily Living- Home Safety   Needs help with the following daily activities None of the above   Safety concerns in the home None of the above         5/2/2025   Dental   Dentist two times  every year? Yes         5/2/2025   Hearing Screening   Hearing concerns? (!) I NEED TO ASK PEOPLE TO SPEAK UP OR REPEAT THEMSELVES.    (!) IT'S HARDER TO UNDERSTAND WOMEN'S VOICES THAN MEN'S VOICES.    (!) IT'S HARD TO FOLLOW A CONVERSATION IN A NOISY RESTAURANT OR CROWDED ROOM.    (!) TROUBLE UNDERSTANDING SOFT OR WHISPERED SPEECH.    (!) TROUBLE UNDERSTANDING SPEECH ON THE TELEPHONE       Multiple values from one day are sorted in reverse-chronological order   Has hearing aids  working      5/2/2025   Driving Risk Screening   Patient/family members have concerns about driving No         5/2/2025   General Alertness/Fatigue Screening   Have you been more tired than usual lately? No         5/2/2025   Urinary Incontinence Screening   Bothered by leaking urine in past 6 months No         Today's PHQ-2 Score:       5/2/2025    11:41 AM   PHQ-2 ( 1999 [employer])   Q1: Little interest or pleasure in doing things 0   Q2: Feeling down, depressed or hopeless 0   PHQ-2 Score 0    Q1: Little interest or pleasure in doing things Not at all   Q2: Feeling down, depressed or hopeless Not at all   PHQ-2 Score 0       Patient-reported           5/2/2025   Substance Use   Alcohol more than 3/day or more than 7/wk No   Do you have a current opioid prescription? No   How severe/bad is pain from 1 to 10? 0/10 (No Pain)   Do you use any other substances recreationally? No     Social History     Tobacco Use    Smoking status: Never    Smokeless tobacco: Never   Vaping Use    Vaping status: Never Used   Substance Use Topics    Alcohol use: Yes     Alcohol/week: 7.0 - 14.0 standard drinks of alcohol    Drug use: Never           5/2/2025   AAA Screening   Family history of Abdominal Aortic Aneurysm (AAA)? No   Last PSA:   Prostate Specific Antigen Screen   Date Value Ref Range Status   05/02/2025 0.35 0.00 - 4.50 ng/mL Final   03/22/2022 0.69 0.00 - 4.50 ug/L Final     ASCVD Risk   The 10-year ASCVD risk score (Parmjit REYES, et al.,  2019) is: 10.6%    Values used to calculate the score:      Age: 65 years      Sex: Male      Is Non- : No      Diabetic: No      Tobacco smoker: No      Systolic Blood Pressure: 113 mmHg      Is BP treated: No      HDL Cholesterol: 45 mg/dL      Total Cholesterol: 150 mg/dL            Reviewed and updated as needed this visit by Provider     Meds                  Current providers sharing in care for this patient include:  Patient Care Team:  Sonali Zacarias MD as PCP - General  Sonali Zacarias MD as Assigned PCP  Fabiana Quigley MD as MD (Otolaryngology)  Farideh Trujillo AuD as Audiologist (Audiology)  Alejandra Kline MD as MD (Dermatology)  Alejandra Kline MD as Assigned Dermatology Provider    The following health maintenance items are reviewed in Epic and correct as of today:  Health Maintenance   Topic Date Due    Pneumococcal Vaccine: 50+ Years (1 of 2 - PCV) Never done    RSV VACCINE (1 - Risk 60-74 years 1-dose series) Never done    COVID-19 Vaccine (9 - 2024-25 season) 07/17/2025    MEDICARE ANNUAL WELLNESS VISIT  05/02/2026    LIPID  05/02/2026    ANNUAL REVIEW OF HM ORDERS  05/02/2026    FALL RISK ASSESSMENT  05/02/2026    DIABETES SCREENING  04/30/2027    DTAP/TDAP/TD IMMUNIZATION (3 - Td or Tdap) 01/25/2029    COLORECTAL CANCER SCREENING  02/11/2030    ADVANCE CARE PLANNING  05/02/2030    HEPATITIS C SCREENING  Completed    PHQ-2 (once per calendar year)  Completed    INFLUENZA VACCINE  Completed    ZOSTER IMMUNIZATION  Completed    HPV IMMUNIZATION  Aged Out    MENINGITIS IMMUNIZATION  Aged Out         Review of Systems  CONSTITUTIONAL: NEGATIVE for fever, chills, change in weight  INTEGUMENTARY/SKIN: NEGATIVE for worrisome rashes, moles or lesions  EYES: NEGATIVE for vision changes or irritation  RESP: NEGATIVE for significant cough or SOB  CV: NEGATIVE for chest pain, palpitations or peripheral edema  GI: NEGATIVE for nausea, abdominal pain, heartburn, or  "change in bowel habits     Objective    Exam  /69 (BP Location: Left arm, Patient Position: Sitting, Cuff Size: Adult Regular)   Pulse 57   Temp 97.5  F (36.4  C) (Temporal)   Resp 11   Ht 1.778 m (5' 10\")   Wt 72.1 kg (159 lb)   SpO2 98%   BMI 22.81 kg/m     Estimated body mass index is 22.81 kg/m  as calculated from the following:    Height as of this encounter: 1.778 m (5' 10\").    Weight as of this encounter: 72.1 kg (159 lb).    Physical Exam  General appearance - alert, well appearing, and in no distress  Mental status - normal mood, behavior, speech, dress, motor activity, and thought processes  Eyes - deferred- recent eye visit  Ears - bilateral TM's and external ear canals normal; wears hearing aids  Mouth - mucous membranes moist, pharynx normal without lesions  Neck - supple, no significant adenopathy, carotids upstroke normal bilaterally, no bruits, thyroid exam: thyroid is normal in size without nodules or tenderness  Chest - clear to auscultation, no wheezes, rales or rhonchi, symmetric air entry  Heart - normal rate, regular rhythm, normal S1, S2, no murmurs, rubs, clicks or gallops  Abdomen - soft, nontender, nondistended, no masses or organomegaly  Neurological - alert, oriented, normal speech, no focal findings or movement disorder noted, DTR's normal and symmetric  Extremities - peripheral pulses normal, no pedal edema, no clubbing or cyanosis  Skin - no rashes or worrisome lesions          5/2/2025   Mini Cog   Clock Draw Score 2 Normal   3 Item Recall 3 objects recalled   Mini Cog Total Score 5              Signed Electronically by: Sonali Zacarias MD    "

## 2025-06-18 NOTE — PROGRESS NOTES
[facility] Dermatology Note  Encounter Date: Jun 20, 2025  Office Visit     Dermatology Problem List:  1. 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 AKs.  - 5FU/C to nose/cheeks 8/2023, fall/winter 2024  - s/p cryotherapy     # Fhx of NMSC in both brothers  ____________________________________________    Assessment & Plan:    # Seborrheic dermatitis, ddx sebopsoriasis - chronic, stable  Reassuringly improved with ketoconazole daily and TMC prn, did not end up starting roflumilast cream.  - continue ketoconazole cream and/or ketoconazole shampoo  - continue triamcinolone 0.025% to affected areas on nose and eyebrows prn     # Aks, L eyebrow x 2, L nasal tip   Discussed cryotherapy vs field therapy. Pt elects for cryotherapy today.   - Cryotherapy performed today. See procedure note below.     # Benign lesions - SKs, cherry angiomas, lentigenes.  - No treatment required    # Multiple benign nevi   - Monitor for ABCDEs of melanoma   - Continue sun protection - recommend SPF 30 or higher with frequent application   - Return sooner if noticing changing or symptomatic lesions     Procedures Performed:   Cryotherapy procedure note: After verbal consent and discussion of risks and benefits including but no limited to dyspigmentation/scar, blister, and pain, 3 Aks was(were) treated with 1-2mm freeze border for 2 cycles with liquid nitrogen. Post cryotherapy instructions were provided.     Follow-up: 12 month(s) in-person, or earlier for new or changing lesions     Staff and Scribe:     Scribe Disclosure:   I, Caitlin Hughes, am serving as a scribe; to document services personally performed by Alejandra Kline MD -based on data collection and the provider's statements to me.     Provider Disclosure:   The documentation recorded by the scribe accurately reflects the services I personally  performed and the decisions made by me.    Alejandra Kline MD    Department of Dermatology  [facility]: Phone: [phone], Fax: [phone]  6/24/2025          ____________________________________________    CC: Skin Check (Here today for a skin check. No concerns. )    HPI:  Mr. Tate Brothers is a(n) 65 year old male who presents today as a return patient for AK recheck.     Did not end up getting roflumilast.   Using ketoconazole cream and TMC 0.25% cream. Doing well    Labs Reviewed:   None    Physical exam:  Vitals: There were no vitals taken for this visit.  GEN: This is a well developed, well-nourished male in no acute distress, in a pleasant mood.    SKIN: Total skin excluding the undergarment areas was performed. The exam included the head/face, neck, both arms, chest, back, abdomen, both legs, digits and/or nails.   - There are erythematous macules with overyling adherent scale on the L eyebrow x 2, L nasal tip.   - Multiple regular brown pigmented macules and papules are identified on the trunk and extremities.   - Scattered brown macules on sun exposed areas.  - There are dome shaped bright red papules on the trunk and extremities.   - Waxy stuck on papules and plaques on trunk and extremities.  - No other lesions of concern on areas examined.     Medications:  Current Outpatient Medications   Medication Sig Dispense Refill    ALPRAZolam (XANAX) 0.5 MG tablet Take 1-2 tablets (0.5-1 mg) by mouth at bedtime. Max 35 pills per month 35 tablet 5    calcipotriene (DOVONOX) 0.005 % external cream Mix efudex + calcipotriene equally. Apply BID x 4-10 days. Stop when skin gets red. 60 g 1    fluorouracil (EFUDEX) 5 % external cream Mix equally with calcipotriene cream. Apply BID x 4-10 days. Stop when skin gets red. 40 g 0    glucosamine-chondroitin 500-400 mg cap [GLUCOSAMINE-CHONDROITIN 500-400 MG CAP] Take 1  capsule by mouth 3 (three) times a day.      MEDICATION CANNOT BE REORDERED - PLEASE MANUALLY REORDER AND DISCONTINUE THE OLD ORDER [BIOFLAV,LEMON/VIT BCOMP,C (LIPOFLAVOVIT ORAL)] Take by mouth.      PFIZER-BIONT COVID-19 VAC-KAIDEN 30 MCG/0.3ML injection       Roflumilast 0.3 % CREA Externally apply 1 Dose topically daily. 60 g 3    rosuvastatin (CRESTOR) 10 MG tablet Take 1 tablet (10 mg) by mouth daily. 90 tablet 3    SHINGRIX injection       tamsulosin (FLOMAX) 0.4 MG capsule TAKE 1 CAPSULE BY MOUTH DAILY AFTER DINNER 90 capsule 3    triamcinolone (KENALOG) 0.025 % cream Apply topically 2 times daily To nose and eyebrows when not responding to ketoconazole cream 30 g 3     No current facility-administered medications for this visit.      Past Medical History:   Patient Active Problem List   Diagnosis    History of prostate cancer    Insomnia due to medical condition    Controlled substance agreement signed    Chronic pain of left knee    Adenomatous colon polyp    Seborrheic dermatitis     Past Medical History:   Diagnosis Date    Infection due to 2019 novel coronavirus 05/2022     CC No referring provider defined for this encounter. on close of this encounter.

## 2025-06-20 ENCOUNTER — OFFICE VISIT (OUTPATIENT)
Dept: DERMATOLOGY | Facility: CLINIC | Age: 65
End: 2025-06-20
Payer: COMMERCIAL

## 2025-06-20 DIAGNOSIS — D18.01 CHERRY ANGIOMA: ICD-10-CM

## 2025-06-20 DIAGNOSIS — L57.0 ACTINIC KERATOSES: ICD-10-CM

## 2025-06-20 DIAGNOSIS — D22.9 MULTIPLE BENIGN NEVI: ICD-10-CM

## 2025-06-20 DIAGNOSIS — L82.1 SEBORRHEIC KERATOSES: ICD-10-CM

## 2025-06-20 DIAGNOSIS — L81.4 SOLAR LENTIGO: ICD-10-CM

## 2025-06-20 DIAGNOSIS — L21.9 SEBORRHEIC DERMATITIS: ICD-10-CM

## 2025-06-20 DIAGNOSIS — Z12.83 SKIN CANCER SCREENING: Primary | ICD-10-CM

## 2025-06-20 PROCEDURE — 17003 DESTRUCT PREMALG LES 2-14: CPT | Performed by: DERMATOLOGY

## 2025-06-20 PROCEDURE — 17000 DESTRUCT PREMALG LESION: CPT | Performed by: DERMATOLOGY

## 2025-06-20 PROCEDURE — 1126F AMNT PAIN NOTED NONE PRSNT: CPT | Performed by: DERMATOLOGY

## 2025-06-20 PROCEDURE — 99213 OFFICE O/P EST LOW 20 MIN: CPT | Mod: 25 | Performed by: DERMATOLOGY

## 2025-06-20 RX ORDER — TRIAMCINOLONE ACETONIDE 0.25 MG/G
CREAM TOPICAL 2 TIMES DAILY
Qty: 30 G | Refills: 3 | Status: SHIPPED | OUTPATIENT
Start: 2025-06-20

## 2025-06-20 ASSESSMENT — PAIN SCALES - GENERAL: PAINLEVEL_OUTOF10: NO PAIN (0)

## 2025-06-20 NOTE — LETTER
6/20/2025       RE: Tate Brothers  [address] [place] [address]     Dear Colleague,    Thank you for referring your patient, Tate Brothers, to the [facility]OLOG[facility] [place] [facility]. Please see a copy of my visit note below.    [facility] Dermatology Note  Encounter Date: Jun 20, 2025  Office Visit     Dermatology Problem List:  1. 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 AKs.  - 5FU/C to nose/cheeks 8/2023, fall/winter 2024  - s/p cryotherapy     # Fhx of NMSC in both brothers  ____________________________________________    Assessment & Plan:    # Seborrheic dermatitis, ddx sebopsoriasis - chronic, stable  Reassuringly improved with ketoconazole daily and TMC prn, did not end up starting roflumilast cream.  - continue ketoconazole cream and/or ketoconazole shampoo  - continue triamcinolone 0.025% to affected areas on nose and eyebrows prn     # Aks, L eyebrow x 2, L nasal tip   Discussed cryotherapy vs field therapy. Pt elects for cryotherapy today.   - Cryotherapy performed today. See procedure note below.     # Benign lesions - SKs, cherry angiomas, lentigenes.  - No treatment required    # Multiple benign nevi   - Monitor for ABCDEs of melanoma   - Continue sun protection - recommend SPF 30 or higher with frequent application   - Return sooner if noticing changing or symptomatic lesions     Procedures Performed:   Cryotherapy procedure note: After verbal consent and discussion of risks and benefits including but no limited to dyspigmentation/scar, blister, and pain, 3 Aks was(were) treated with 1-2mm freeze border for 2 cycles with liquid nitrogen. Post cryotherapy instructions were provided.     Follow-up: 12 month(s) in-person, or earlier for new or changing lesions     Staff  and Scribe:     Scribe Disclosure:   I, Caitlin Hugehs, am serving as a scribe; to document services personally performed by Alejandra Kline MD -based on data collection and the provider's statements to me.     Provider Disclosure:   The documentation recorded by the scribe accurately reflects the services I personally performed and the decisions made by me.    Alejandra Kline MD    Department of Dermatology  [facility]: Phone: [phone], Fax: [phone]  6/24/2025          ____________________________________________    CC: Skin Check (Here today for a skin check. No concerns. )    HPI:  Mr. Tate Brothers is a(n) 65 year old male who presents today as a return patient for AK recheck.     Did not end up getting roflumilast.   Using ketoconazole cream and TMC 0.25% cream. Doing well    Labs Reviewed:   None    Physical exam:  Vitals: There were no vitals taken for this visit.  GEN: This is a well developed, well-nourished male in no acute distress, in a pleasant mood.    SKIN: Total skin excluding the undergarment areas was performed. The exam included the head/face, neck, both arms, chest, back, abdomen, both legs, digits and/or nails.   - There are erythematous macules with overyling adherent scale on the L eyebrow x 2, L nasal tip.   - Multiple regular brown pigmented macules and papules are identified on the trunk and extremities.   - Scattered brown macules on sun exposed areas.  - There are dome shaped bright red papules on the trunk and extremities.   - Waxy stuck on papules and plaques on trunk and extremities.  - No other lesions of concern on areas examined.     Medications:  Current Outpatient Medications   Medication Sig Dispense Refill     ALPRAZolam (XANAX) 0.5 MG tablet Take 1-2 tablets (0.5-1 mg) by mouth at bedtime. Max 35 pills per month 35 tablet 5     calcipotriene (DOVONOX) 0.005 %  external cream Mix efudex + calcipotriene equally. Apply BID x 4-10 days. Stop when skin gets red. 60 g 1     fluorouracil (EFUDEX) 5 % external cream Mix equally with calcipotriene cream. Apply BID x 4-10 days. Stop when skin gets red. 40 g 0     glucosamine-chondroitin 500-400 mg cap [GLUCOSAMINE-CHONDROITIN 500-400 MG CAP] Take 1 capsule by mouth 3 (three) times a day.       MEDICATION CANNOT BE REORDERED - PLEASE MANUALLY REORDER AND DISCONTINUE THE OLD ORDER [BIOFLAV,LEMON/VIT BCOMP,C (LIPOFLAVOVIT ORAL)] Take by mouth.       PFIZER-BIONT COVID-19 VAC-KAIDEN 30 MCG/0.3ML injection        Roflumilast 0.3 % CREA Externally apply 1 Dose topically daily. 60 g 3     rosuvastatin (CRESTOR) 10 MG tablet Take 1 tablet (10 mg) by mouth daily. 90 tablet 3     SHINGRIX injection        tamsulosin (FLOMAX) 0.4 MG capsule TAKE 1 CAPSULE BY MOUTH DAILY AFTER DINNER 90 capsule 3     triamcinolone (KENALOG) 0.025 % cream Apply topically 2 times daily To nose and eyebrows when not responding to ketoconazole cream 30 g 3     No current facility-administered medications for this visit.      Past Medical History:   Patient Active Problem List   Diagnosis     History of prostate cancer     Insomnia due to medical condition     Controlled substance agreement signed     Chronic pain of left knee     Adenomatous colon polyp     Seborrheic dermatitis     Past Medical History:   Diagnosis Date     Infection due to 2019 novel coronavirus 05/2022     CC No referring provider defined for this encounter. on close of this encounter.    Again, thank you for allowing me to participate in the care of your patient.      Sincerely,    Alejandra Kline MD

## 2025-06-20 NOTE — PATIENT INSTRUCTIONS
Checking for Skin Cancer  You can help find cancer early by checking your skin each month. There are 3 main kinds of skin cancer: melanoma, basal cell carcinoma, and squamous cell carcinoma. Doing monthly skin checks is the best way to find new marks, sores, or skin changes. Follow these instructions for checking your skin.   The ABCDEs of checking moles for melanoma   Check your moles or growths for signs of melanoma using ABCDE:   Asymmetry: The sides of the mole or growth don t match.  Border: The edges are ragged, notched, or blurred.  Color: The color within the mole or growth varies. It could be black, brown, tan, white, or shades of red, gray, or blue.  Diameter: The mole or growth is larger than   inch or 6 mm (size of a pencil eraser).  Evolving: The size, shape, texture, or color of the mole or growth is changing.     ABCDE's of moles on light skin.        ABCDE's of moles on dark skin may be harder to identify.     Checking for other types of skin cancer  Basal cell carcinoma or squamous cell carcinoma cause symptoms like:     A spot or mole that looks different from all other marks on your skin  Changes in how an area feels, such as itching, tenderness, or pain  Changes in the skin's surface, such as oozing, bleeding, or scaliness  A sore that doesn't heal  New swelling, redness, or spread of color beyond the border of a mole    Who s at risk?  Anyone of any skin color can get skin cancer. But you're at greater risk if you have:   Fair skin that freckles easily and burns instead of tanning  Light-colored or red hair  Light-colored eyes  Many moles or abnormal moles on your skin  A long history of unprotected exposure to sunlight or tanning beds  A history of many blistering sunburns as a child or teen  A family history of skin cancer  Been exposed to radiation or chemicals  A weakened immune system  Been exposed to arsenic  If you've had skin cancer in the past, you're at high risk of having it again.    How to check your skin  Do your monthly skin checkups in front of a full-length mirror. Use a room with good lighting so it's easier to see. Use a hand mirror to look at hard-to-see places like your buttocks and back. You can also have a trusted friend or family member help you with these checks. Check every part of your body, including your:   Head (ears, face, neck, and scalp)  Torso (front, back, sides, and under breasts)  Arms (tops, undersides, and armpits)  Hands (palms, backs, and fingers, including under the nails)  Lower back, buttocks, and genitals  Legs (front, back, and sides)  Feet (tops, soles, toes, including under the nails, and between toes)  Watch for new spots on your skin or a spot that's changing in color, shape, size.   If you have a lot of moles, take digital photos of them each month. Make sure to take photos both up close and from a distance. These can help you see if any moles change over time.   Know your skin  Most skin changes aren't cancer. But if you see any changes in your skin, call your healthcare provider right away. Only they can tell you if a change is a problem. If you have skin cancer, seeing your provider can be the first step to getting the treatment that could save your life.   Amelia last reviewed this educational content on 10/1/2021    3530-4433 [employer]. All rights reserved. This information is not intended as a substitute for professional medical care. Always follow your healthcare professional's instructions.     Cryotherapy    What is it?  Use of a very cold liquid, such as liquid nitrogen, to freeze and destroy abnormal skin cells that need to be removed    What should I expect?  Tenderness and redness  A small blister that might grow and fill with dark purple blood. There may be crusting.  More than one treatment may be needed if the lesions do not go away.    How do I care for the treated area?  Gently wash the area with your hands when  bathing.  Use a thin layer of Vaseline to help with healing. You may use a Band-Aid.   The area should heal within 7-10 days and may leave behind a pink or lighter color.   Do not use an antibiotic or Neosporin ointment.   You may take acetaminophen (Tylenol) for pain.     Call your doctor if you have:  Severe pain  Signs of infection (warmth, redness, cloudy yellow drainage, and or a bad smell)  Questions or concerns    Who should I call with questions?      [facility]: [phone]      [facility]: [phone]      For urgent needs outside of business hours call the [facility] at [phone] and ask for the dermatology resident on call

## 2025-06-20 NOTE — NURSING NOTE
Dermatology Rooming Note    Tate Brothers's goals for this visit include:   Chief Complaint   Patient presents with    Skin Check     Here today for a skin check. No concerns.      Audrey Comer RN